# Patient Record
Sex: MALE | Race: WHITE | Employment: UNEMPLOYED | URBAN - METROPOLITAN AREA
[De-identification: names, ages, dates, MRNs, and addresses within clinical notes are randomized per-mention and may not be internally consistent; named-entity substitution may affect disease eponyms.]

---

## 2021-08-10 ENCOUNTER — OFFICE VISIT (OUTPATIENT)
Dept: FAMILY MEDICINE CLINIC | Age: 61
End: 2021-08-10
Payer: MEDICAID

## 2021-08-10 VITALS
SYSTOLIC BLOOD PRESSURE: 120 MMHG | DIASTOLIC BLOOD PRESSURE: 84 MMHG | BODY MASS INDEX: 25.6 KG/M2 | HEIGHT: 70 IN | WEIGHT: 178.8 LBS | TEMPERATURE: 97.2 F | RESPIRATION RATE: 16 BRPM | OXYGEN SATURATION: 98 % | HEART RATE: 66 BPM

## 2021-08-10 DIAGNOSIS — E11.42 TYPE 2 DIABETES MELLITUS WITH DIABETIC POLYNEUROPATHY, WITH LONG-TERM CURRENT USE OF INSULIN (HCC): Primary | ICD-10-CM

## 2021-08-10 DIAGNOSIS — Z79.4 TYPE 2 DIABETES MELLITUS WITH DIABETIC POLYNEUROPATHY, WITH LONG-TERM CURRENT USE OF INSULIN (HCC): Primary | ICD-10-CM

## 2021-08-10 DIAGNOSIS — I10 ESSENTIAL HYPERTENSION: ICD-10-CM

## 2021-08-10 DIAGNOSIS — E78.5 HYPERLIPIDEMIA, UNSPECIFIED HYPERLIPIDEMIA TYPE: ICD-10-CM

## 2021-08-10 DIAGNOSIS — E03.9 ACQUIRED HYPOTHYROIDISM: ICD-10-CM

## 2021-08-10 PROCEDURE — 99204 OFFICE O/P NEW MOD 45 MIN: CPT | Performed by: STUDENT IN AN ORGANIZED HEALTH CARE EDUCATION/TRAINING PROGRAM

## 2021-08-10 RX ORDER — GABAPENTIN 600 MG/1
600 TABLET ORAL 3 TIMES DAILY
Qty: 270 TABLET | Refills: 0 | Status: SHIPPED | OUTPATIENT
Start: 2021-08-10 | End: 2022-01-04 | Stop reason: SDUPTHER

## 2021-08-10 RX ORDER — LISINOPRIL 5 MG/1
5 TABLET ORAL DAILY
Qty: 90 TABLET | Refills: 1 | Status: SHIPPED | OUTPATIENT
Start: 2021-08-10 | End: 2022-01-04 | Stop reason: SDUPTHER

## 2021-08-10 RX ORDER — METFORMIN HYDROCHLORIDE 1000 MG/1
1000 TABLET ORAL 2 TIMES DAILY WITH MEALS
Qty: 180 TABLET | Refills: 1 | Status: SHIPPED | OUTPATIENT
Start: 2021-08-10 | End: 2022-01-04 | Stop reason: SDUPTHER

## 2021-08-10 RX ORDER — LEVOTHYROXINE SODIUM 88 UG/1
88 TABLET ORAL
Qty: 90 TABLET | Refills: 0 | Status: SHIPPED | OUTPATIENT
Start: 2021-08-10 | End: 2022-01-04 | Stop reason: DRUGHIGH

## 2021-08-10 RX ORDER — SYRINGE-NEEDLE,INSULIN,0.5 ML 31 GX5/16"
SYRINGE, EMPTY DISPOSABLE MISCELLANEOUS
Qty: 100 SYRINGE | Refills: 11 | Status: SHIPPED | OUTPATIENT
Start: 2021-08-10 | End: 2022-01-12 | Stop reason: SDUPTHER

## 2021-08-10 RX ORDER — LEVOTHYROXINE SODIUM 75 UG/1
TABLET ORAL
COMMUNITY
End: 2021-08-10

## 2021-08-10 RX ORDER — INSULIN LISPRO 100 [IU]/ML
INJECTION, SOLUTION INTRAVENOUS; SUBCUTANEOUS
Qty: 30 VIAL | Refills: 0
Start: 2021-08-10 | End: 2022-04-12 | Stop reason: SDUPTHER

## 2021-08-10 RX ORDER — LANCETS
EACH MISCELLANEOUS
Qty: 1 EACH | Refills: 11 | Status: SHIPPED | OUTPATIENT
Start: 2021-08-10

## 2021-08-10 RX ORDER — INSULIN PUMP SYRINGE, 3 ML
EACH MISCELLANEOUS
Qty: 1 KIT | Refills: 0 | Status: SHIPPED | OUTPATIENT
Start: 2021-08-10

## 2021-08-10 RX ORDER — ATORVASTATIN CALCIUM 40 MG/1
40 TABLET, FILM COATED ORAL DAILY
Qty: 90 TABLET | Refills: 0 | Status: SHIPPED | OUTPATIENT
Start: 2021-08-10 | End: 2022-01-04 | Stop reason: SDUPTHER

## 2021-08-10 RX ORDER — METFORMIN HYDROCHLORIDE 500 MG/1
TABLET ORAL 2 TIMES DAILY WITH MEALS
COMMUNITY
End: 2021-08-10

## 2021-08-10 NOTE — PROGRESS NOTES
Name and  Verified. Previous PCP: Juan Reyes  NP: Florian Hassan [facility])    Released Signed      Chief Complaint   Patient presents with    [facility].     Released form [facility] 3/7/2021. Patient stated that he feels frustrated. Trying to get life back together Denies any stress or depression. 1. Have you been to the ER, urgent care clinic since your last visit? Hospitalized since your last visit? No    2. Have you seen or consulted any other health care providers outside of the [address] [place] [address] since your last visit? Include any pap smears or colon screening.  No      Health Maintenance Due   Topic Date Due    Hepatitis C Screening  Never done    DTaP/Tdap/Td series (1 - Tdap) Never done    Lipid Screen  Never done    Colorectal Cancer Screening Combo  Never done    Shingrix Vaccine Age 50> (1 of 2) Never done

## 2021-08-10 NOTE — PROGRESS NOTES
[facility]  [address] [place], [address] [place] [address]  [phone]    C/C: Diabetes, HLD and Hypothyroidism    HPI:    Zachary Mansfield is a 63 y.o. male who presents to clinic today for evaluation of the issues listed above. Pt is new to the practice . [facility]ased in 03/2021 after a year of incarceration. PMHx: Reviewed and updated under problems. Current concerns;    1. DM type II:  Diagnosed around 2008. Has been on Metformin in the past in addition to insulin in the past.   Out of his medications for several months since released from senior living because of no insurance and limited finances. 2. Acquired hypothyroidism  Has been on Levothyroxine for years. States that he had overactive thyroid and he developed hypothyroidism after undergoing radioactive iodine. Pt has not had any of his medications for a several months due to no finances and no insurances. 3. HLD: Was on lipitor    Pt denies any  fever, chill, night sweats, chest pain, pressure, SOB, OLIVEIRA, PND, orthopnea, abdominal pain, n/v/d, melena, hematuria, dysuria, constipation, HA, dizziness, and syncope. Other Health Habits and social history:  Smoking history: no  Alcohol history: no  Occupation: Works at [employer] (on [employer])  Marital status: Patient is currently  and has 2 children (27 yo son and daughter 22 yo)    Current Medications: Reviewed and updated in the chart.     Allergies- reviewed:   Not on File    Past Medical History- reviewed:  Past Medical History:   Diagnosis Date    Diabetes ([facility] [address]     Diverticulosis     Neuropathy     Thyroid disease        Family History - reviewed:  Family History   Problem Relation Age of Onset    Heart Disease Mother     Cancer Mother     Cancer Father     Cancer Sister        Social History - reviewed:  Social History     Socioeconomic History    Marital status: UNKNOWN     Spouse name: Not on file    Number of children: Not on file    Years of education: Not on file    Highest education level: Not on file   Occupational History    Not on file   Tobacco Use    Smoking status: Former Smoker     Years: 20.00     Types: Cigarettes    Smokeless tobacco: Never Used    Tobacco comment: Quit 25 years ago   Vaping Use    Vaping Use: Never used   Substance and Sexual Activity    Alcohol use: Not Currently    Drug use: Not Currently    Sexual activity: Not Currently   Other Topics Concern    Not on file   Social History Narrative    Not on file     Social Determinants of Health     Financial Resource Strain:     Difficulty of Paying Living Expenses:    Food Insecurity:     Worried About Running Out of Food in the Last Year:     [address] in the Last Year:    Transportation Needs:     Lack of Transportation (Medical):  Lack of Transportation (Non-Medical):    Physical Activity:     Days of Exercise per Week:     Minutes of Exercise per Session:    Stress:     Feeling of Stress :    Social Connections:     Frequency of Communication with Friends and Family:     Frequency of Social Gatherings with Friends and Family:     Attends Gnosticist Services:     Active Member of Clubs or Organizations:     Attends Club or Organization Meetings:     Marital Status:    Intimate Partner Violence:     Fear of Current or Ex-Partner:     Emotionally Abused:     Physically Abused:     Sexually Abused:        Review of systems:     A comprehensive review of systems was negative except for that written in the History of Present Illness. Visit Vitals  /84 (BP 1 Location: Left upper arm, BP Patient Position: Sitting, BP Cuff Size: Adult)   Pulse 66   Temp 97.2 °F (36.2 °C) (Oral)   Resp 16   Ht 5' 10\" (1.778 m)   Wt 178 lb 12.8 oz (81.1 kg)   SpO2 98%   BMI 25.66 kg/m²       General: Alert and oriented, in no acute distress. Well nourished. EYE: PERRL.  Sclera and conjuctival clear. Extraocular movements intact. EARS: External normal, canals clear, tympanic membranes normal.   NOSE: Mucosa healthy without drainage or ulceration. OROPHARYNX: No suspicious lesions, normal dentition, pharynx, tongue and tonsils normal.  NECK: Supple; no masses; thyroid normal.  LUNGS: Respirations unlabored; clear to auscultation bilaterally. CARDIOVASCULAR: Regular, rate, and rhythm without murmurs, gallops or rubs. ABDOMEN: Soft; nontender; nondistended; normoactive bowel sounds; no masses or organomegaly. MUSCULOSKELETAL: FROM in all extremities     EXT: No edema. Neurovascularlly intact. Normal gait. SKIN: No rash. No suspicious lesions or moles. Neuro: Mental Status: Pt is alert and oriented to person, place, and time. Assessment/Plan       ICD-10-CM ICD-9-CM    1. Type 2 diabetes mellitus with diabetic polyneuropathy, with long-term current use of insulin (HCC)  E11.42 250.60 HEMOGLOBIN A1C WITH EAG    U74.3 250.1 METABOLIC PANEL, BASIC     V58.67 MICROALBUMIN, UR, RAND W/ MICROALB/CREAT RATIO      insulin NPH (NovoLIN N NPH U-100 Insulin) 100 unit/mL injection      insulin lispro (HUMALOG) 100 unit/mL injection      metFORMIN (GLUCOPHAGE) 1,000 mg tablet      gabapentin (NEURONTIN) 600 mg tablet      Insulin Syringe-Needle U-100 0.3 mL 29 gauge x 1/2\" syrg      MICROALBUMIN, UR, RAND W/ MICROALB/CREAT RATIO      METABOLIC PANEL, BASIC      HEMOGLOBIN A1C WITH EAG      lisinopriL (PRINIVIL, ZESTRIL) 5 mg tablet      Blood-Glucose Meter monitoring kit      lancets misc      glucose blood VI test strips (ASCENSIA AUTODISC VI, ONE TOUCH ULTRA TEST VI) strip   2. Acquired hypothyroidism  E03.9 244.9 TSH 3RD GENERATION      TSH 3RD GENERATION      levothyroxine (Levo-T) 88 mcg tablet   3.  Hyperlipidemia, unspecified hyperlipidemia type  E78.5 272.4 LIPID PANEL      atorvastatin (LIPITOR) 40 mg tablet      LIPID PANEL   4. Essential hypertension  I10 401.9 lisinopriL (PRINIVIL, ZESTRIL) 5 mg tablet       1. Type 2 diabetes mellitus with diabetic polyneuropathy, with long-term current use of insulin (HCC)    - HEMOGLOBIN A1C WITH EAG; Future  - METABOLIC PANEL, BASIC; Future  - MICROALBUMIN, UR, RAND W/ MICROALB/CREAT RATIO; Future  - insulin NPH (NovoLIN N NPH U-100 Insulin) 100 unit/mL injection; 20 Units by SubCUTAneous route Before breakfast and dinner. Dispense: 12 mL; Refill: 2  - insulin lispro (HUMALOG) 100 unit/mL injection; follow sliding scale  - metFORMIN (GLUCOPHAGE) 1,000 mg tablet; Take 1 Tablet by mouth two (2) times daily (with meals). - gabapentin (NEURONTIN) 600 mg tablet; Take 1 Tablet by mouth three (3) times daily for 90 days. - Insulin Syringe-Needle U-100 0.3 mL 29 gauge x 1/2\" syrg; Use one syringe per insulin injection  Dispense: 100 Syringe; Refill: 11  - MICROALBUMIN, UR, RAND W/ MICROALB/CREAT RATIO  - METABOLIC PANEL, BASIC  - HEMOGLOBIN A1C WITH EAG  - lisinopriL (PRINIVIL, ZESTRIL) 5 mg tablet; Take 1 Tablet by mouth daily.    - Blood-Glucose Meter monitoring kit; Check blood sugar daily  - lancets misc; Use one new lancet per blood glucose check    - glucose blood VI test strips (ASCENSIA AUTODISC VI, ONE TOUCH ULTRA TEST VI) strip; Use one new test strip per blood glucose check     2. Acquired hypothyroidism  - TSH 3RD GENERATION; Future  - Levothyroxine 88 mcg daily    3. Screening cholesterol level  - LIPID PANEL; Future    4. Hyperlipidemia, unspecified hyperlipidemia type  - atorvastatin (LIPITOR) 40 mg tablet; Take 1 Tablet by mouth daily.    - I counseled on lifestyle changes; discussed of the importance of eating habits/patterns and physical activity to overall health. Also discussed the importance to avoid smoking and excessive alcohol consumption.  - Encouraged to eat foods that are baked, broiled, boiled, steamed or grilled. Avoid greasy or fried foods. Use olive oil or canola oil instead of vegetable oil. Eat fish twice weekly.  Decreasing weight by 5-10% of baseline weight over the next 6 months if overweight/obese helps to improve obesity-related conditions. Eat a low carbohydrate diet. Decrease sugary beverages, white foods and sweets. Increase exercise to 5 days weekly for 30 minutes daily minimally and as tolerated. Have at an average of 7 hours of uninterrupted sleep at night. 5. Essential hypertension  Lisinopril 5 mg daily    Follow up: 4-6 weeks    On this date 08/10/21 I have spent 45 minutes reviewing previous notes, test results and face to face  with the patient discussing the diagnosis and importance of compliance with the treatment plan as well as documenting on the day of the visit. I have discussed the diagnosis with the patient and the intended plan as seen in the above orders. The patient has received an after-visit summary and questions were answered concerning future plans. I have discussed medication side effects and warnings with the patient as well. Informed patient to return to the office if new symptoms arise.     Signed By: Christina Null MD     August 10, 2021

## 2021-08-11 LAB
ANION GAP SERPL CALC-SCNC: 4 MMOL/L (ref 5–15)
BUN SERPL-MCNC: 19 MG/DL (ref 6–20)
BUN/CREAT SERPL: 16 (ref 12–20)
CALCIUM SERPL-MCNC: 9.9 MG/DL (ref 8.5–10.1)
CHLORIDE SERPL-SCNC: 103 MMOL/L (ref 97–108)
CHOLEST SERPL-MCNC: 269 MG/DL
CO2 SERPL-SCNC: 29 MMOL/L (ref 21–32)
CREAT SERPL-MCNC: 1.19 MG/DL (ref 0.7–1.3)
CREAT UR-MCNC: 160 MG/DL
EST. AVERAGE GLUCOSE BLD GHB EST-MCNC: 278 MG/DL
GLUCOSE SERPL-MCNC: 193 MG/DL (ref 65–100)
HBA1C MFR BLD: 11.3 % (ref 4–5.6)
HDLC SERPL-MCNC: 70 MG/DL
HDLC SERPL: 3.8 {RATIO} (ref 0–5)
LDLC SERPL CALC-MCNC: 167.8 MG/DL (ref 0–100)
MICROALBUMIN UR-MCNC: 3.83 MG/DL
MICROALBUMIN/CREAT UR-RTO: 24 MG/G (ref 0–30)
POTASSIUM SERPL-SCNC: 4.2 MMOL/L (ref 3.5–5.1)
SODIUM SERPL-SCNC: 136 MMOL/L (ref 136–145)
TRIGL SERPL-MCNC: 156 MG/DL (ref ?–150)
TSH SERPL DL<=0.05 MIU/L-ACNC: 56.6 UIU/ML (ref 0.36–3.74)
VLDLC SERPL CALC-MCNC: 31.2 MG/DL

## 2021-08-13 NOTE — PROGRESS NOTES
Reviewed. HLD  A1C 11.3  TSH 56.60    Results expected as pt released from long-term and ran out of all his meds for at least a month.   Refilled all the medications including insulin and will follow in 4 weeks

## 2021-08-17 ENCOUNTER — DOCUMENTATION ONLY (OUTPATIENT)
Dept: FAMILY MEDICINE CLINIC | Age: 61
End: 2021-08-17

## 2021-08-17 DIAGNOSIS — Z79.4 TYPE 2 DIABETES MELLITUS WITH DIABETIC POLYNEUROPATHY, WITH LONG-TERM CURRENT USE OF INSULIN (HCC): ICD-10-CM

## 2021-08-17 DIAGNOSIS — E11.42 TYPE 2 DIABETES MELLITUS WITH DIABETIC POLYNEUROPATHY, WITH LONG-TERM CURRENT USE OF INSULIN (HCC): ICD-10-CM

## 2021-08-17 NOTE — PROGRESS NOTES
Reviewed Notes from [facility]    Per report, pt Has PMH as reported. Report does not indicate what medications or doses which he is currently on    Full report will be scanned into chart.     Signed By: Cailin Doe MD     August 17, 2021

## 2021-08-17 NOTE — TELEPHONE ENCOUNTER
Per pharmacy faxed request. Previous order 0.3 mL 29 gauge x 1/2\" is not able to be order by pharmacy. Requesting 0.3mL 31 gauge 5/16\" . Request uploaded and sent to Dr. Capri Worrell to sign off.

## 2022-01-04 ENCOUNTER — OFFICE VISIT (OUTPATIENT)
Dept: FAMILY MEDICINE CLINIC | Age: 62
End: 2022-01-04
Payer: COMMERCIAL

## 2022-01-04 VITALS
RESPIRATION RATE: 16 BRPM | WEIGHT: 170.6 LBS | TEMPERATURE: 98.4 F | BODY MASS INDEX: 24.42 KG/M2 | SYSTOLIC BLOOD PRESSURE: 109 MMHG | OXYGEN SATURATION: 99 % | HEART RATE: 68 BPM | HEIGHT: 70 IN | DIASTOLIC BLOOD PRESSURE: 67 MMHG

## 2022-01-04 DIAGNOSIS — E78.5 HYPERLIPIDEMIA, UNSPECIFIED HYPERLIPIDEMIA TYPE: ICD-10-CM

## 2022-01-04 DIAGNOSIS — Z12.11 COLON CANCER SCREENING: ICD-10-CM

## 2022-01-04 DIAGNOSIS — Z11.59 NEED FOR HEPATITIS C SCREENING TEST: ICD-10-CM

## 2022-01-04 DIAGNOSIS — Z79.4 TYPE 2 DIABETES MELLITUS WITH DIABETIC POLYNEUROPATHY, WITH LONG-TERM CURRENT USE OF INSULIN (HCC): Primary | ICD-10-CM

## 2022-01-04 DIAGNOSIS — F32.1 CURRENT MODERATE EPISODE OF MAJOR DEPRESSIVE DISORDER, UNSPECIFIED WHETHER RECURRENT (HCC): ICD-10-CM

## 2022-01-04 DIAGNOSIS — I10 ESSENTIAL HYPERTENSION: ICD-10-CM

## 2022-01-04 DIAGNOSIS — E11.42 TYPE 2 DIABETES MELLITUS WITH DIABETIC POLYNEUROPATHY, WITH LONG-TERM CURRENT USE OF INSULIN (HCC): Primary | ICD-10-CM

## 2022-01-04 DIAGNOSIS — E03.9 ACQUIRED HYPOTHYROIDISM: ICD-10-CM

## 2022-01-04 LAB
HBA1C MFR BLD HPLC: 12.4 %
HCV AB SERPL QL IA: NONREACTIVE
T4 FREE SERPL-MCNC: 0.3 NG/DL (ref 0.8–1.5)
TSH SERPL DL<=0.05 MIU/L-ACNC: 52.5 UIU/ML (ref 0.36–3.74)

## 2022-01-04 PROCEDURE — 83036 HEMOGLOBIN GLYCOSYLATED A1C: CPT | Performed by: STUDENT IN AN ORGANIZED HEALTH CARE EDUCATION/TRAINING PROGRAM

## 2022-01-04 PROCEDURE — 99214 OFFICE O/P EST MOD 30 MIN: CPT | Performed by: STUDENT IN AN ORGANIZED HEALTH CARE EDUCATION/TRAINING PROGRAM

## 2022-01-04 RX ORDER — DULOXETIN HYDROCHLORIDE 30 MG/1
CAPSULE, DELAYED RELEASE ORAL
Qty: 90 CAPSULE | Refills: 0 | Status: SHIPPED | OUTPATIENT
Start: 2022-01-04 | End: 2022-02-28 | Stop reason: SDUPTHER

## 2022-01-04 RX ORDER — LEVOTHYROXINE SODIUM 112 UG/1
112 TABLET ORAL
Qty: 90 TABLET | Refills: 0 | Status: SHIPPED | OUTPATIENT
Start: 2022-01-04 | End: 2022-04-13 | Stop reason: SDUPTHER

## 2022-01-04 RX ORDER — METFORMIN HYDROCHLORIDE 1000 MG/1
1000 TABLET ORAL 2 TIMES DAILY WITH MEALS
Qty: 180 TABLET | Refills: 1 | Status: SHIPPED | OUTPATIENT
Start: 2022-01-04

## 2022-01-04 RX ORDER — LEVOTHYROXINE SODIUM 88 UG/1
88 TABLET ORAL
Qty: 90 TABLET | Refills: 0 | Status: CANCELLED | OUTPATIENT
Start: 2022-01-04

## 2022-01-04 RX ORDER — ATORVASTATIN CALCIUM 40 MG/1
40 TABLET, FILM COATED ORAL DAILY
Qty: 90 TABLET | Refills: 0 | Status: SHIPPED | OUTPATIENT
Start: 2022-01-04 | End: 2022-07-07 | Stop reason: SDUPTHER

## 2022-01-04 RX ORDER — LISINOPRIL 5 MG/1
5 TABLET ORAL DAILY
Qty: 90 TABLET | Refills: 1 | Status: CANCELLED | OUTPATIENT
Start: 2022-01-04

## 2022-01-04 RX ORDER — LISINOPRIL 5 MG/1
TABLET ORAL
Qty: 90 TABLET | Refills: 2 | Status: SHIPPED | OUTPATIENT
Start: 2022-01-04 | End: 2022-07-07 | Stop reason: SDUPTHER

## 2022-01-04 RX ORDER — INSULIN GLARGINE 100 [IU]/ML
30 INJECTION, SOLUTION SUBCUTANEOUS
Qty: 27 ML | Refills: 0 | Status: SHIPPED | OUTPATIENT
Start: 2022-01-04 | End: 2022-04-12 | Stop reason: SDUPTHER

## 2022-01-04 RX ORDER — GABAPENTIN 600 MG/1
600 TABLET ORAL 3 TIMES DAILY
Qty: 270 TABLET | Refills: 0 | Status: SHIPPED | OUTPATIENT
Start: 2022-01-04 | End: 2022-07-07 | Stop reason: SDUPTHER

## 2022-01-04 RX ORDER — METFORMIN HYDROCHLORIDE 1000 MG/1
1000 TABLET ORAL 2 TIMES DAILY WITH MEALS
Qty: 180 TABLET | Refills: 1 | Status: CANCELLED | OUTPATIENT
Start: 2022-01-04

## 2022-01-04 NOTE — PROGRESS NOTES
Chief Complaint   Patient presents with    Follow-up     foot pain, med refills       1. Have you been to the ER, urgent care clinic since your last visit? Hospitalized since your last visit? No    2. Have you seen or consulted any other health care providers outside of the [address] [place] [address] since your last visit? Include any pap smears or colon screening. No     Pt is here for routine follow up today - needs to discuss worsening foot pain and neuropathy. Requesting refill of gabapentin today. He also would like to discuss nausea and stomach pain especially in the morning - he is taking omperazole otc and lots of tylenol with no relief. He says he frequently does not feel hungry.

## 2022-01-04 NOTE — PROGRESS NOTES
[address]  [phone]    C/C: Diabetes, HLD and Hypothyroidism    HPI:    Douglas Wu is a 63 y.o. male who presents to clinic today for evaluation of the issues listed above. Released from incarceration on 03/2021. This is a long overdue visit. As patient states that he did not have a car at that time but now he has a car. Current concerns;    1. DM type II with polyneuropathy:  Diagnosed around 2008. Does not check  BG constantly at home although he has all the supplies. Metformin 1000 mg BID. Has not been compliant with his insulin regimen (NPH). States that he ran out but it is unclear why he did not get refills at the pharmacy. States that he has been using his Brother's Lantus, injecting 25 units daily (previously on NPH 20units while incarcerated). Also uses correctional scale when needed. Admits that his diet isn't the best as he likes sweet stuffs. Takes Gabapentin for neuropathy. Has intermittent heartburn especially in the morning. Takes omeprazole as needed which helps. 2. Acquired hypothyroidism  Has been on Levothyroxine for years. States that he had overactive thyroid and he developed hypothyroidism after undergoing radioactive iodine. Has not been compliant with his medications. Last TSH of 56.60. 3. HLD: Was on lipitor    Pt denies any  fever, chill, night sweats, chest pain or SOB.       Other Health Habits and social history:  Smoking history: no  Alcohol history: no  Occupation: Works at [employer] (on [employer])  Marital status: Patient is currently  and has 2 children (27 yo son and daughter 22 yo)    Allergies- reviewed:   No Known Allergies    Past Medical History- reviewed:  Past Medical History:   Diagnosis Date    Diabetes ([facility] [address]     Diverticulosis     Neuropathy     Thyroid disease        Family History - reviewed:  Family History   Problem Relation Age of Onset    Heart Disease Mother     Cancer Mother     Cancer Father     Cancer Sister        Social History - reviewed:  Social History     Socioeconomic History    Marital status: UNKNOWN     Spouse name: Not on file    Number of children: Not on file    Years of education: Not on file    Highest education level: Not on file   Occupational History    Not on file   Tobacco Use    Smoking status: Former Smoker     Years: 20.00     Types: Cigarettes    Smokeless tobacco: Never Used    Tobacco comment: Quit 25 years ago   Vaping Use    Vaping Use: Never used   Substance and Sexual Activity    Alcohol use: Not Currently    Drug use: Not Currently    Sexual activity: Not Currently   Other Topics Concern    Not on file   Social History Narrative    Not on file     Social Determinants of Health     Financial Resource Strain:     Difficulty of Paying Living Expenses: Not on file   Food Insecurity:     Worried About [address] in the Last Year: Not on file    [address] [place] in the Last Year: Not on file   Transportation Needs:     Lack of Transportation (Medical): Not on file    Lack of Transportation (Non-Medical):  Not on file   Physical Activity:     Days of Exercise per Week: Not on file    Minutes of Exercise per Session: Not on file   Stress:     Feeling of Stress : Not on file   Social Connections:     Frequency of Communication with Friends and Family: Not on file    Frequency of Social Gatherings with Friends and Family: Not on file    Attends Taoist Services: Not on file    Active Member of Clubs or Organizations: Not on file    Attends Club or Organization Meetings: Not on file    Marital Status: Not on file   Intimate Partner Violence:     Fear of Current or Ex-Partner: Not on file    Emotionally Abused: Not on file    Physically Abused: Not on file    Sexually Abused: Not on file   Housing Stability:     Unable to Pay for Housing in the Last Year: Not on file  Number of Places Lived in the Last Year: Not on file    Unstable Housing in the Last Year: Not on file       Review of systems:     A comprehensive review of systems was negative except for that written in the History of Present Illness. Visit Vitals  /67   Pulse 68   Temp 98.4 °F (36.9 °C) (Oral)   Resp 16   Ht 5' 10\" (1.778 m)   Wt 170 lb 9.6 oz (77.4 kg)   SpO2 99%   BMI 24.48 kg/m²       General: Alert and oriented, in no acute distress. Well nourished. EYE: PERRL. Sclera and conjuctival clear. Extraocular movements intact. EARS: External normal, canals clear, tympanic membranes normal.   NOSE: Mucosa healthy without drainage or ulceration. OROPHARYNX: No suspicious lesions, normal dentition, pharynx, tongue and tonsils normal.  NECK: Supple; no masses; thyroid normal.  LUNGS: Respirations unlabored; clear to auscultation bilaterally. CARDIOVASCULAR: Regular, rate, and rhythm without murmurs, gallops or rubs. ABDOMEN: Soft; nontender; nondistended; normoactive bowel sounds; no masses or organomegaly. MUSCULOSKELETAL: FROM in all extremities     EXT: No edema. Neurovascularlly intact. Normal gait. SKIN: No rash. No suspicious lesions or moles. Neuro: Mental Status: Pt is alert and oriented to person, place, and time.       3 most recent PHQ Screens 1/4/2022   Little interest or pleasure in doing things More than half the days   Feeling down, depressed, irritable, or hopeless More than half the days   Total Score PHQ 2 4   Trouble falling or staying asleep, or sleeping too much Nearly every day   Feeling tired or having little energy Nearly every day   Poor appetite, weight loss, or overeating Not at all   Feeling bad about yourself - or that you are a failure or have let yourself or your family down Several days   Trouble concentrating on things such as school, work, reading, or watching TV Not at all   Moving or speaking so slowly that other people could have noticed; or the opposite being so fidgety that others notice Several days   Thoughts of being better off dead, or hurting yourself in some way Not at all   PHQ 9 Score 12   How difficult have these problems made it for you to do your work, take care of your home and get along with others Somewhat difficult         Assessment/Plan       ICD-10-CM ICD-9-CM    1. Type 2 diabetes mellitus with diabetic polyneuropathy, with long-term current use of insulin (HCC)  E11.42 250.60 AMB POC HEMOGLOBIN A1C    Z79.4 357.2  DIABETES FOOT EXAM     V58.67 empagliflozin (JARDIANCE) 10 mg tablet      metFORMIN (GLUCOPHAGE) 1,000 mg tablet      insulin glargine (LANTUS,BASAGLAR) 100 unit/mL (3 mL) inpn      T4, FREE      TSH 3RD GENERATION      gabapentin (NEURONTIN) 600 mg tablet      REFERRAL TO DIABETIC EDUCATION   2. Essential hypertension  I10 401.9 lisinopriL (PRINIVIL, ZESTRIL) 5 mg tablet   3. Acquired hypothyroidism  E03.9 244.9 TSH 3RD GENERATION      T4, FREE      levothyroxine (SYNTHROID) 112 mcg tablet   4. Colon cancer screening  Z12.11 V76.51 REFERRAL FOR COLONOSCOPY   5. Need for hepatitis C screening test  Z11.59 V73.89 HEPATITIS C AB      HEPATITIS C AB   6. Hyperlipidemia, unspecified hyperlipidemia type  E78.5 272.4 atorvastatin (LIPITOR) 40 mg tablet   7. Current moderate episode of major depressive disorder, unspecified whether recurrent (HCC)  F32.1 296.22 DULoxetine (CYMBALTA) 30 mg capsule     1. Type 2 diabetes mellitus with diabetic polyneuropathy, with long-term current use of insulin ([facility] [facility]    Discussed the importance of compliance with his medications and close follow up. His A1C is worsening. Will add Jardiance along with the savings card and increase Lantus dose. Lab Results   Component Value Date/Time    Hemoglobin A1c 11.3 (H) 08/10/2021 11:18 AM    Hemoglobin A1c (POC) 12.4 01/04/2022 10:30 AM     - START empagliflozin (JARDIANCE) 10 mg tablet; Take 1 Tablet by mouth daily.   - Continue metFORMIN (GLUCOPHAGE) 1,000 mg tablet; Take 1 Tablet by mouth two (2) times daily (with meals). - START insulin glargine (LANTUS,BASAGLAR) 100 unit/mL (3 mL) inpn; 30 Units by SubCUTAneous route nightly for 90 days. - Continue gabapentin (NEURONTIN) 600 mg tablet; Take 1 Tablet by mouth three (3) times daily for 90 days. - Continue home monitoring. Glucose goals; Fasting (), preprandial (<140), 2-hr post-prandial (<180)  - Will refer for diabetes education/counseling (Dr. Louise Sherman, PharmD)  -  DIABETES FOOT EXAM  - Had eye exam recently    Diabetic issues reviewed : glycemic goals , written exchange diet given, low carbohydrate diet (avoid white bread, white rice, white pasta, white potatoes, sodas, and sweets) , weight control (exercise at least 30 mins daily x 5 days), home glucose monitoring emphasized,  hypoglycemia management and long term diabetic complications discussed. Flu shot annually ,Pneumovax (once before 72years old and then again after your are 72years old),aspirin daily,ACE-I/ARB and statin if indicated, annual eye exam and microalbumin. 2. Essential hypertension  Will half his Lisinopril as BP trend low  - lisinopriL (PRINIVIL, ZESTRIL) 5 mg tablet; TAKE 1/2 TABLET DAILY    3. Acquired hypothyroidism  Noncompliant. .  Last TSH >56  - START levothyroxine (SYNTHROID) 112 mcg tablet; Take 1 Tablet by mouth Daily (before breakfast). 4. Colon cancer screening  - REFERRAL FOR COLONOSCOPY    5. Need for hepatitis C screening test  - HEPATITIS C AB; Future    6. Hyperlipidemia, unspecified hyperlipidemia type  - start atorvastatin (LIPITOR) 40 mg tablet; Take 1 Tablet by mouth daily. 7. Current moderate episode of major depressive disorder, unspecified whether recurrent ([facility] [address]  We discussed multi-faceted approach to depression including anti-depressant medication, counseling, exercise, building and maintaining support network/relationships, jennifer community.  No SI/HI    He is not interested in therapy or going to psych.    -Scored 12/27 on PHQ-9  - DULoxetine (CYMBALTA) 30 mg capsule; TAKE ONE TABLET DAILY IN THE MORNING. INCREASE TO ONE TABLET TWICE DAILY IF TOLERATED AFTER 1-2 WEEKS      - Patient Education:  Reviewed concept of anxiety/depression as biochemical imbalance of neurotransmitters and rationale for treatment. Explained that SSRI/SNRI can take 2-3 weeks before symptoms subside. - Instructed patient to contact office or on-call physician promptly should condition worsen or any new symptoms appear.  -  IF THE PATIENT HAS ANY SUICIDAL OR HOMICIDAL IDEATION, CALL THE OFFICE, DISCUSS WITH A SUPPORT MEMBER OR GO TO THE ER IMMEDIATELY. Patient was agreeable with this plan. - The National Suicide Prevention Lifeline provides free and confidential emotional support to people in suicidal crisi or emotional distress. The services are provided 24 hours a day, 7 days a week. Please call 6-672-838-OYUV ([phone]) if needed. Follow up: 2-3 months with me. Will follow up sooner with Dr. Alexia Bass for his diabetes    I have discussed the diagnosis with the patient and the intended plan as seen in the above orders. The patient has received an after-visit summary and questions were answered concerning future plans. I have discussed medication side effects and warnings with the patient as well. Informed patient to return to the office if new symptoms arise.     Signed By: Gloria Rogers MD     January 4, 2022

## 2022-01-05 NOTE — PROGRESS NOTES
Reviewed. TSH 52.5, likely because of noncompliance.   Already increase synthroid dose and will follow up with patient

## 2022-01-12 ENCOUNTER — OFFICE VISIT (OUTPATIENT)
Dept: FAMILY MEDICINE CLINIC | Age: 62
End: 2022-01-12

## 2022-01-12 DIAGNOSIS — Z79.4 TYPE 2 DIABETES MELLITUS WITH DIABETIC POLYNEUROPATHY, WITH LONG-TERM CURRENT USE OF INSULIN (HCC): Primary | ICD-10-CM

## 2022-01-12 DIAGNOSIS — E11.42 TYPE 2 DIABETES MELLITUS WITH DIABETIC POLYNEUROPATHY, WITH LONG-TERM CURRENT USE OF INSULIN (HCC): Primary | ICD-10-CM

## 2022-01-12 RX ORDER — OMEPRAZOLE 20 MG/1
20 CAPSULE, DELAYED RELEASE ORAL DAILY
COMMUNITY
End: 2022-01-13 | Stop reason: SDUPTHER

## 2022-01-12 NOTE — PROGRESS NOTES
Pt referred by Dr. Wilver Arteaga for diabetes education and management. He's a fairly new patient establishing care with Dr. Wilver Arteaga. Last visit had run out of insulin and noted some issues with adherence. A1c of 12.4%  Should be taking Lantus, Humalog, metformin, and newly started jardiance. Of not elevated TSH at that visit also; will encourage adherence to levothyroxine as well. The hypothyroid state can lead to higher sugars as well. Pt presents feeling well overall. Motivated to get control of his diabetes now. Had had diabetes about 10 years he thinks. Both sides of family have diabetes, DM2 like he has  Oral medications first when diagnosed with diabetes and feels like was controlled for a while. Pt works at [employer]. 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   Reports adherence to levothyroxine - talked about importance of this  Buying omeprazole from over the counter - states he needs this daily for reflux; it's high in cost and he would like to see if his insurance covers it     SMBGs:  Checks sugars once daily not every day   280, 320, 360  Recall  Doesn't recall symptoms of low sugar  Hard to check his sugars when working. Discussed CGM with him as well as insulin pens for meal insulin if this would be easier to use while working or on the go. Diabetes is uncontrolled, may be improving with becoming more adherent and starting jardiance. Do not have enough information to fully assess, consider insulin adjustments. Did encourage continued adherence and discussed portion control with carbohydrates and sweets  Discussed diabetes goals and complication prevention. Encouraged pt to attend upcoming diabetes education classes that are scheduled   Sent Libre2 sensors to see if covered to consider using if so as CGM may help patient   Sent order for omeprazole to pharmacy   Add pen needles in also - no print  - getting from brother in law now but may need in the future   Per CPA with Dr. Tommy Grant need order for test strips also but does not know type so will let us know if needed     Follow up in 4 weeks with meter and Cesia if covered. Patient verbalized understanding of information presented. Answered all of the patient's questions. Doroteo Lin, PHARMD, [facility] [address] in place:  Yes   Recommendation Provided To: Patient/Caregiver: 3 via In person   Intervention Detail: New Rx: 1, reason: Needs Additional Therapy, Refill(s) Provided and Scheduled Appointment   Intervention Accepted By: Patient/Caregiver: 3   Time Spent (min): 60

## 2022-01-13 RX ORDER — OMEPRAZOLE 20 MG/1
20 CAPSULE, DELAYED RELEASE ORAL DAILY
Qty: 30 CAPSULE | Refills: 5 | Status: SHIPPED | OUTPATIENT
Start: 2022-01-13 | End: 2022-04-12 | Stop reason: DRUGHIGH

## 2022-01-13 RX ORDER — PEN NEEDLE, DIABETIC 31 GX3/16"
NEEDLE, DISPOSABLE MISCELLANEOUS
Qty: 100 PEN NEEDLE | Refills: 1
Start: 2022-01-13

## 2022-01-13 RX ORDER — OMEPRAZOLE 20 MG/1
20 CAPSULE, DELAYED RELEASE ORAL DAILY
Qty: 30 CAPSULE | Refills: 5 | Status: SHIPPED | OUTPATIENT
Start: 2022-01-13 | End: 2022-01-13 | Stop reason: SDUPTHER

## 2022-01-13 RX ORDER — FLASH GLUCOSE SENSOR
KIT MISCELLANEOUS
Qty: 2 KIT | Refills: 5 | Status: SHIPPED | OUTPATIENT
Start: 2022-01-13 | End: 2022-04-12 | Stop reason: SDUPTHER

## 2022-02-28 DIAGNOSIS — F32.1 CURRENT MODERATE EPISODE OF MAJOR DEPRESSIVE DISORDER, UNSPECIFIED WHETHER RECURRENT (HCC): ICD-10-CM

## 2022-02-28 NOTE — TELEPHONE ENCOUNTER
Last visit 01/04/2022 MD Cohen   Next appointment 04/12/2022 MD Cohen   Previous refill encounter(s)   01/04/2022 Cymbalta #90     Requested Prescriptions     Pending Prescriptions Disp Refills    DULoxetine (CYMBALTA) 30 mg capsule 180 Capsule 0     Sig: Take 1 Capsule by mouth two (2) times a day.

## 2022-03-02 RX ORDER — DULOXETIN HYDROCHLORIDE 30 MG/1
30 CAPSULE, DELAYED RELEASE ORAL 2 TIMES DAILY
Qty: 180 CAPSULE | Refills: 0 | Status: SHIPPED | OUTPATIENT
Start: 2022-03-02 | End: 2022-04-13 | Stop reason: SDUPTHER

## 2022-03-09 ENCOUNTER — NURSE TRIAGE (OUTPATIENT)
Dept: OTHER | Facility: CLINIC | Age: 62
End: 2022-03-09

## 2022-03-09 NOTE — TELEPHONE ENCOUNTER
Received call from [place] at [facility] with Red Flag Complaint. Subjective: Caller states started having left neck and shoulder pain 1 week ago,worsening symptoms past 2 days. Wiith movement has pain that starts left side in back,shoulder and neck, gets short of breath,heaviness in chest,hunches over,can't lift head up. [facility]. Current Symptoms: Tunnel vision at present,\"feels like I'm drunk\" speech slurred last night. Chest pain,SOB with exertion today,see above    Onset: 1 week ago shoulder and neck pain,now worsening symptoms. Associated Symptoms: NA    Pain Severity: 5/10 with movement     Temperature: Denies    What has been tried:     Recommended disposition: Call  Now    Care advice provided, patient verbalizes understanding; denies any other questions or concerns; instructed to call back for any new or worsening symptoms. Patient/caller agrees to calling 911    Attention Provider: Thank you for allowing me to participate in the care of your patient. The patient was connected to triage in response to information provided to the ECC. Please do not respond through this encounter as the response is not directed to a shared pool.     Reason for Disposition   Sounds like a life-threatening emergency to the triager    Protocols used: CHEST PAIN-ADULT-OH

## 2022-04-12 ENCOUNTER — OFFICE VISIT (OUTPATIENT)
Dept: FAMILY MEDICINE CLINIC | Age: 62
End: 2022-04-12
Payer: COMMERCIAL

## 2022-04-12 VITALS
WEIGHT: 161 LBS | OXYGEN SATURATION: 99 % | TEMPERATURE: 97.8 F | RESPIRATION RATE: 16 BRPM | HEIGHT: 70 IN | SYSTOLIC BLOOD PRESSURE: 112 MMHG | HEART RATE: 72 BPM | DIASTOLIC BLOOD PRESSURE: 85 MMHG | BODY MASS INDEX: 23.05 KG/M2

## 2022-04-12 DIAGNOSIS — K21.9 GASTROESOPHAGEAL REFLUX DISEASE, UNSPECIFIED WHETHER ESOPHAGITIS PRESENT: ICD-10-CM

## 2022-04-12 DIAGNOSIS — E03.8 OTHER SPECIFIED HYPOTHYROIDISM: ICD-10-CM

## 2022-04-12 DIAGNOSIS — Z12.11 COLON CANCER SCREENING: ICD-10-CM

## 2022-04-12 DIAGNOSIS — E11.42 TYPE 2 DIABETES MELLITUS WITH DIABETIC POLYNEUROPATHY, WITH LONG-TERM CURRENT USE OF INSULIN (HCC): Primary | ICD-10-CM

## 2022-04-12 DIAGNOSIS — Z79.4 TYPE 2 DIABETES MELLITUS WITH DIABETIC POLYNEUROPATHY, WITH LONG-TERM CURRENT USE OF INSULIN (HCC): Primary | ICD-10-CM

## 2022-04-12 LAB — HBA1C MFR BLD HPLC: 11.6 %

## 2022-04-12 PROCEDURE — 3046F HEMOGLOBIN A1C LEVEL >9.0%: CPT | Performed by: STUDENT IN AN ORGANIZED HEALTH CARE EDUCATION/TRAINING PROGRAM

## 2022-04-12 PROCEDURE — 99214 OFFICE O/P EST MOD 30 MIN: CPT | Performed by: STUDENT IN AN ORGANIZED HEALTH CARE EDUCATION/TRAINING PROGRAM

## 2022-04-12 PROCEDURE — 83036 HEMOGLOBIN GLYCOSYLATED A1C: CPT | Performed by: STUDENT IN AN ORGANIZED HEALTH CARE EDUCATION/TRAINING PROGRAM

## 2022-04-12 RX ORDER — FLASH GLUCOSE SCANNING READER
1 EACH MISCELLANEOUS DAILY
Qty: 1 EACH | Refills: 0 | Status: SHIPPED | OUTPATIENT
Start: 2022-04-12

## 2022-04-12 RX ORDER — INSULIN GLARGINE 100 [IU]/ML
40 INJECTION, SOLUTION SUBCUTANEOUS
Qty: 36 ML | Refills: 3 | Status: SHIPPED | OUTPATIENT
Start: 2022-04-12

## 2022-04-12 RX ORDER — PANTOPRAZOLE SODIUM 40 MG/1
40 TABLET, DELAYED RELEASE ORAL DAILY
Qty: 90 TABLET | Refills: 3 | Status: SHIPPED | OUTPATIENT
Start: 2022-04-12

## 2022-04-12 RX ORDER — INSULIN LISPRO 100 [IU]/ML
INJECTION, SOLUTION INTRAVENOUS; SUBCUTANEOUS
Qty: 6 ML | Refills: 0
Start: 2022-04-12 | End: 2022-04-13 | Stop reason: SDUPTHER

## 2022-04-12 RX ORDER — FLASH GLUCOSE SENSOR
KIT MISCELLANEOUS
Qty: 2 KIT | Refills: 5 | Status: SHIPPED | OUTPATIENT
Start: 2022-04-12 | End: 2022-10-25

## 2022-04-12 NOTE — PATIENT INSTRUCTIONS
DIABETES;  - TAKE 40 UNITS LANTUS DAILY  - CONTINUE METFORMIN AND JARDIANCE AT CURRENT DOSE  - SLIDING SCALE INSULIN;     150-200  Add 2 units     201-250  Add 4 units    251-300  Add 6 units  301-350  Add 8 units  351-400  Add 10 Units    - Work on diet and exercise.

## 2022-04-12 NOTE — PROGRESS NOTES
Name and  Verified. Pharmacy verified    Chief Complaint   Patient presents with    Follow-up     3 months 2022 Diabetes       1. Have you been to the ER, urgent care clinic since your last visit? Hospitalized since your last visit? No    2. Have you seen or consulted any other health care providers outside of the [address] [place] [address] since your last visit? Include any pap smears or colon screening.  No      Health Maintenance Due   Topic Date Due    Pneumococcal 0-64 years (1 of 2 - PPSV23) Never done    Eye Exam Retinal or Dilated  Never done    DTaP/Tdap/Td series (1 - Tdap) Never done    Colorectal Cancer Screening Combo  Never done    Shingrix Vaccine Age 50> (1 of 2) Never done    COVID-19 Vaccine (3 - Booster for Pfizer series) 2021    A1C test (Diabetic or Prediabetic)  2022

## 2022-04-12 NOTE — PROGRESS NOTES
[facility]  [address] [place], [address]  [phone]    C/C: Diabetes, depression and Hypothyroidism    HPI:    Aakash Guerrero is a 63 y.o. male who presents to clinic today for evaluation of the issues listed above. Current concerns;    1. DM type II with polyneuropathy:  Diagnosed around 2008. Has not been checking home BG at home. Metformin 1000 mg BID. Jardiance 10mg   Lantus 30 units daily  Has not been supplied Lispro, pt unsure why but states that insurance likely did not cover for it. Admits that his diet isn't the best as he likes sweet stuffs. 2. Acquired hypothyroidism  Last TSH >50. Pt previously not compliant as he ran out his medication. Now taking synthroid 112 mcg. Compliant with medications. 3) Depression:  States that he is doing well with Cymbalta. Pt denies any  fever, chill, night sweats, chest pain or SOB.     Other Health Habits and social history:  Smoking history: no  Alcohol history: no  Occupation: Works at [employer] (on [address])  Marital status: Patient is currently  and has 2 children (25 yo son and daughter 22 yo)    Allergies- reviewed:   No Known Allergies    Past Medical History- reviewed:  Past Medical History:   Diagnosis Date    Diabetes ([facility] [address]     Diverticulosis     Neuropathy     Thyroid disease        Family History - reviewed:  Family History   Problem Relation Age of Onset    Heart Disease Mother     Cancer Mother     Cancer Father     Cancer Sister        Social History - reviewed:  Social History     Socioeconomic History    Marital status: SINGLE     Spouse name: Not on file    Number of children: Not on file    Years of education: Not on file    Highest education level: Not on file   Occupational History    Not on file   Tobacco Use    Smoking status: Former Smoker     Years: 20.00     Types: Cigarettes    Smokeless tobacco: Never Used    Tobacco comment: Quit 25 years ago   Vaping Use    Vaping Use: Never used   Substance and Sexual Activity    Alcohol use: Not Currently    Drug use: Not Currently    Sexual activity: Not Currently   Other Topics Concern    Not on file   Social History Narrative    Not on file     Social Determinants of Health     Financial Resource Strain:     Difficulty of Paying Living Expenses: Not on file   Food Insecurity:     Worried About Running Out of Food in the Last Year: Not on file    [place] of Food in the Last Year: Not on file   Transportation Needs:     Lack of Transportation (Medical): Not on file    Lack of Transportation (Non-Medical): Not on file   Physical Activity:     Days of Exercise per Week: Not on file    Minutes of Exercise per Session: Not on file   Stress:     Feeling of Stress : Not on file   Social Connections:     Frequency of Communication with Friends and Family: Not on file    Frequency of Social Gatherings with Friends and Family: Not on file    Attends Zoroastrian Services: Not on file    Active Member of [address] [place] [address] or Organizations: Not on file    Attends Club or Organization Meetings: Not on file    Marital Status: Not on file   Intimate Partner Violence:     Fear of Current or Ex-Partner: Not on file    Emotionally Abused: Not on file    Physically Abused: Not on file    Sexually Abused: Not on file   Housing Stability:     Unable to Pay for Housing in the Last Year: Not on file    Number of [place] in the Last Year: Not on file    Unstable Housing in the Last Year: Not on file       Review of systems:     A comprehensive review of systems was negative except for that written in the History of Present Illness.        Visit Vitals  /85 (BP 1 Location: Right arm, BP Patient Position: Sitting, BP Cuff Size: Adult)   Pulse 72   Temp 97.8 °F (36.6 °C) (Oral)   Resp 16   Ht 5' 10\" (1.778 m)   Wt 161 lb (73 kg)   SpO2 99%   BMI 23.10 kg/m²       General: Alert and oriented, in no acute distress. Well nourished. EYE: PERRL. Sclera and conjuctival clear. Extraocular movements intact. EARS: External normal, canals clear, tympanic membranes normal.   NOSE: Mucosa healthy without drainage or ulceration. OROPHARYNX: No suspicious lesions, normal dentition, pharynx, tongue and tonsils normal.  NECK: Supple; no masses; thyroid normal.  LUNGS: Respirations unlabored; clear to auscultation bilaterally. CARDIOVASCULAR: Regular, rate, and rhythm without murmurs, gallops or rubs. ABDOMEN: Soft; nontender; nondistended; normoactive bowel sounds; no masses or organomegaly. MUSCULOSKELETAL: FROM in all extremities     EXT: No edema. Neurovascularlly intact. Normal gait. SKIN: No rash. No suspicious lesions or moles. Neuro: Mental Status: Pt is alert and oriented to person, place, and time. 3 most recent PHQ Screens 1/4/2022   Little interest or pleasure in doing things More than half the days   Feeling down, depressed, irritable, or hopeless More than half the days   Total Score PHQ 2 4   Trouble falling or staying asleep, or sleeping too much Nearly every day   Feeling tired or having little energy Nearly every day   Poor appetite, weight loss, or overeating Not at all   Feeling bad about yourself - or that you are a failure or have let yourself or your family down Several days   Trouble concentrating on things such as school, work, reading, or watching TV Not at all   Moving or speaking so slowly that other people could have noticed; or the opposite being so fidgety that others notice Several days   Thoughts of being better off dead, or hurting yourself in some way Not at all   PHQ 9 Score 12   How difficult have these problems made it for you to do your work, take care of your home and get along with others Somewhat difficult         Assessment/Plan       ICD-10-CM ICD-9-CM    1.  Type 2 diabetes mellitus with diabetic polyneuropathy, with long-term current use of insulin ([facility])  E11.42 250.60 AMB POC HEMOGLOBIN A1C    Z79.4 357.2      V58.67      1. Type 2 diabetes mellitus with diabetic polyneuropathy, with long-term current use of insulin (HCC)    Poorly controlled. Will increase Lantus to 40units daily. Lab Results   Component Value Date/Time    Hemoglobin A1c 11.3 (H) 08/10/2021 11:18 AM    Hemoglobin A1c (POC) 11.6 04/12/2022 09:38 AM       - Continue home monitoring. Glucose goals; Fasting (), preprandial (<140), 2-hr post-prandial (<180)    - flash glucose scanning reader (FreeStyle Cesia 2 [place]) misc; 1 UNSPECIFIED by Does Not Apply route daily. - flash glucose sensor (FreeStyle Cesia 2 Sensor) kit; Use to check sugar multiple days daily as directed. - METABOLIC PANEL, BASIC; Future  - START insulin glargine (LANTUS,BASAGLAR) 100 unit/mL (3 mL) inpn; 40 Units by SubCUTAneous route nightly. - insulin lispro (HUMALOG) 100 unit/mL injection; Follow sliding scale information of discharge paperwork   -Continue Metformin 1g BID and Jardiance 10mg daily. 2. Colon cancer screening  - COLOGUARD TEST (FECAL DNA COLORECTAL CANCER SCREENING)    3. Other specified hypothyroidism    - TSH 3RD GENERATION; Future  - T4, FREE; Future  - Continue Synthroid 112 mcg daily. 4. Gastroesophageal reflux disease, unspecified whether esophagitis present  - pantoprazole (PROTONIX) 40 mg tablet; Take 1 Tablet by mouth daily. Follow up: 3 months with me. Will follow up sooner with Dr. Helene Mccullough for his diabetes    I have discussed the diagnosis with the patient and the intended plan as seen in the above orders. The patient has received an after-visit summary and questions were answered concerning future plans. I have discussed medication side effects and warnings with the patient as well. Informed patient to return to the office if new symptoms arise.     Signed By: Omer Cheatham MD     April 12, 2022

## 2022-04-13 LAB
ANION GAP SERPL CALC-SCNC: 7 MMOL/L (ref 5–15)
BUN SERPL-MCNC: 25 MG/DL (ref 6–20)
BUN/CREAT SERPL: 23 (ref 12–20)
CALCIUM SERPL-MCNC: 10.3 MG/DL (ref 8.5–10.1)
CHLORIDE SERPL-SCNC: 98 MMOL/L (ref 97–108)
CO2 SERPL-SCNC: 29 MMOL/L (ref 21–32)
CREAT SERPL-MCNC: 1.11 MG/DL (ref 0.7–1.3)
GLUCOSE SERPL-MCNC: 383 MG/DL (ref 65–100)
POTASSIUM SERPL-SCNC: 4.4 MMOL/L (ref 3.5–5.1)
SODIUM SERPL-SCNC: 134 MMOL/L (ref 136–145)
T4 FREE SERPL-MCNC: 0.5 NG/DL (ref 0.8–1.5)
TSH SERPL DL<=0.05 MIU/L-ACNC: 33.2 UIU/ML (ref 0.36–3.74)

## 2022-04-14 DIAGNOSIS — E11.42 TYPE 2 DIABETES MELLITUS WITH DIABETIC POLYNEUROPATHY, WITH LONG-TERM CURRENT USE OF INSULIN (HCC): ICD-10-CM

## 2022-04-14 DIAGNOSIS — Z79.4 TYPE 2 DIABETES MELLITUS WITH DIABETIC POLYNEUROPATHY, WITH LONG-TERM CURRENT USE OF INSULIN (HCC): ICD-10-CM

## 2022-04-14 RX ORDER — INSULIN LISPRO 100 [IU]/ML
INJECTION, SOLUTION INTRAVENOUS; SUBCUTANEOUS
Qty: 6 ML | Refills: 5 | Status: SHIPPED | OUTPATIENT
Start: 2022-04-14 | End: 2022-04-15 | Stop reason: SDUPTHER

## 2022-04-15 DIAGNOSIS — E11.42 TYPE 2 DIABETES MELLITUS WITH DIABETIC POLYNEUROPATHY, WITH LONG-TERM CURRENT USE OF INSULIN (HCC): ICD-10-CM

## 2022-04-15 DIAGNOSIS — Z79.4 TYPE 2 DIABETES MELLITUS WITH DIABETIC POLYNEUROPATHY, WITH LONG-TERM CURRENT USE OF INSULIN (HCC): ICD-10-CM

## 2022-04-15 RX ORDER — INSULIN LISPRO 100 [IU]/ML
INJECTION, SOLUTION INTRAVENOUS; SUBCUTANEOUS
Qty: 6 ML | Refills: 5 | Status: SHIPPED | OUTPATIENT
Start: 2022-04-15

## 2022-04-21 NOTE — PROGRESS NOTES
Reviewed. TSH improving, though still very high (33.20). We discussed worsening A1C      Please inform patient that because his TSH is still very high, he should increase Synthroid dose as follows; Take one tablet of 112 mcg daily Monday to Friday and Double the dose on Saturday and Sunday.  We will recheck at follow up

## 2022-08-29 ENCOUNTER — HOSPITAL ENCOUNTER (EMERGENCY)
Age: 62
Discharge: HOME | End: 2022-08-29
Attending: EMERGENCY MEDICINE
Payer: COMMERCIAL

## 2022-08-29 ENCOUNTER — APPOINTMENT (OUTPATIENT)
Dept: CT IMAGING | Age: 62
End: 2022-08-29
Attending: EMERGENCY MEDICINE
Payer: COMMERCIAL

## 2022-08-29 VITALS
SYSTOLIC BLOOD PRESSURE: 135 MMHG | OXYGEN SATURATION: 98 % | HEIGHT: 72 IN | HEART RATE: 92 BPM | WEIGHT: 165 LBS | BODY MASS INDEX: 22.35 KG/M2 | DIASTOLIC BLOOD PRESSURE: 84 MMHG | RESPIRATION RATE: 20 BRPM | TEMPERATURE: 98.3 F

## 2022-08-29 DIAGNOSIS — M54.6 ACUTE BILATERAL THORACIC BACK PAIN: Primary | ICD-10-CM

## 2022-08-29 LAB
ALBUMIN SERPL-MCNC: 3.8 G/DL (ref 3.5–5)
ALBUMIN/GLOB SERPL: 1 {RATIO} (ref 1.1–2.2)
ALP SERPL-CCNC: 70 U/L (ref 45–117)
ALT SERPL-CCNC: 31 U/L (ref 12–78)
ANION GAP SERPL CALC-SCNC: 10 MMOL/L (ref 5–15)
AST SERPL-CCNC: 15 U/L (ref 15–37)
BASOPHILS # BLD: 0.1 K/UL (ref 0–0.1)
BASOPHILS NFR BLD: 1 % (ref 0–1)
BILIRUB SERPL-MCNC: 0.5 MG/DL (ref 0.2–1)
BUN SERPL-MCNC: 22 MG/DL (ref 6–20)
BUN/CREAT SERPL: 21 (ref 12–20)
CALCIUM SERPL-MCNC: 9.4 MG/DL (ref 8.5–10.1)
CHLORIDE SERPL-SCNC: 102 MMOL/L (ref 97–108)
CO2 SERPL-SCNC: 24 MMOL/L (ref 21–32)
CREAT SERPL-MCNC: 1.07 MG/DL (ref 0.7–1.3)
DIFFERENTIAL METHOD BLD: NORMAL
EOSINOPHIL # BLD: 0.1 K/UL (ref 0–0.4)
EOSINOPHIL NFR BLD: 2 % (ref 0–7)
ERYTHROCYTE [DISTWIDTH] IN BLOOD BY AUTOMATED COUNT: 12.4 % (ref 11.5–14.5)
GLOBULIN SER CALC-MCNC: 3.7 G/DL (ref 2–4)
GLUCOSE SERPL-MCNC: 222 MG/DL (ref 65–100)
HCT VFR BLD AUTO: 48 % (ref 36.6–50.3)
HGB BLD-MCNC: 16.7 G/DL (ref 12.1–17)
IMM GRANULOCYTES # BLD AUTO: 0 K/UL (ref 0–0.04)
IMM GRANULOCYTES NFR BLD AUTO: 0 % (ref 0–0.5)
LYMPHOCYTES # BLD: 1 K/UL (ref 0.8–3.5)
LYMPHOCYTES NFR BLD: 14 % (ref 12–49)
MCH RBC QN AUTO: 32.1 PG (ref 26–34)
MCHC RBC AUTO-ENTMCNC: 34.8 G/DL (ref 30–36.5)
MCV RBC AUTO: 92.1 FL (ref 80–99)
MONOCYTES # BLD: 0.6 K/UL (ref 0–1)
MONOCYTES NFR BLD: 8 % (ref 5–13)
NEUTS SEG # BLD: 5.3 K/UL (ref 1.8–8)
NEUTS SEG NFR BLD: 75 % (ref 32–75)
NRBC # BLD: 0 K/UL (ref 0–0.01)
NRBC BLD-RTO: 0 PER 100 WBC
PLATELET # BLD AUTO: 195 K/UL (ref 150–400)
PMV BLD AUTO: 9.1 FL (ref 8.9–12.9)
POTASSIUM SERPL-SCNC: 3.9 MMOL/L (ref 3.5–5.1)
PROT SERPL-MCNC: 7.5 G/DL (ref 6.4–8.2)
RBC # BLD AUTO: 5.21 M/UL (ref 4.1–5.7)
SODIUM SERPL-SCNC: 136 MMOL/L (ref 136–145)
WBC # BLD AUTO: 7 K/UL (ref 4.1–11.1)

## 2022-08-29 PROCEDURE — 74011000636 HC RX REV CODE- 636: Performed by: EMERGENCY MEDICINE

## 2022-08-29 PROCEDURE — 85025 COMPLETE CBC W/AUTO DIFF WBC: CPT

## 2022-08-29 PROCEDURE — 36415 COLL VENOUS BLD VENIPUNCTURE: CPT

## 2022-08-29 PROCEDURE — 80053 COMPREHEN METABOLIC PANEL: CPT

## 2022-08-29 PROCEDURE — 74174 CTA ABD&PLVS W/CONTRAST: CPT

## 2022-08-29 PROCEDURE — 71275 CT ANGIOGRAPHY CHEST: CPT

## 2022-08-29 PROCEDURE — 96375 TX/PRO/DX INJ NEW DRUG ADDON: CPT

## 2022-08-29 PROCEDURE — 99285 EMERGENCY DEPT VISIT HI MDM: CPT

## 2022-08-29 PROCEDURE — 74011250636 HC RX REV CODE- 250/636: Performed by: EMERGENCY MEDICINE

## 2022-08-29 PROCEDURE — 96374 THER/PROPH/DIAG INJ IV PUSH: CPT

## 2022-08-29 RX ORDER — HYDROCODONE BITARTRATE AND ACETAMINOPHEN 5; 325 MG/1; MG/1
1 TABLET ORAL
Qty: 10 TABLET | Refills: 0 | Status: SHIPPED | OUTPATIENT
Start: 2022-08-29 | End: 2022-09-01

## 2022-08-29 RX ORDER — LIDOCAINE 4 G/100G
PATCH TOPICAL
Qty: 5 PATCH | Refills: 0 | Status: SHIPPED | OUTPATIENT
Start: 2022-08-29

## 2022-08-29 RX ORDER — MORPHINE SULFATE 2 MG/ML
2 INJECTION, SOLUTION INTRAMUSCULAR; INTRAVENOUS
Status: COMPLETED | OUTPATIENT
Start: 2022-08-29 | End: 2022-08-29

## 2022-08-29 RX ORDER — ONDANSETRON 2 MG/ML
4 INJECTION INTRAMUSCULAR; INTRAVENOUS
Status: COMPLETED | OUTPATIENT
Start: 2022-08-29 | End: 2022-08-29

## 2022-08-29 RX ORDER — HYDROCODONE BITARTRATE AND ACETAMINOPHEN 7.5; 325 MG/1; MG/1
1 TABLET ORAL ONCE
Status: DISCONTINUED | OUTPATIENT
Start: 2022-08-29 | End: 2022-08-29

## 2022-08-29 RX ORDER — KETOROLAC TROMETHAMINE 30 MG/ML
15 INJECTION, SOLUTION INTRAMUSCULAR; INTRAVENOUS
Status: COMPLETED | OUTPATIENT
Start: 2022-08-29 | End: 2022-08-29

## 2022-08-29 RX ORDER — NAPROXEN 500 MG/1
500 TABLET ORAL
Qty: 20 TABLET | Refills: 0 | Status: SHIPPED | OUTPATIENT
Start: 2022-08-29

## 2022-08-29 RX ADMIN — MORPHINE SULFATE 2 MG: 2 INJECTION, SOLUTION INTRAMUSCULAR; INTRAVENOUS at 19:15

## 2022-08-29 RX ADMIN — IOPAMIDOL 100 ML: 755 INJECTION, SOLUTION INTRAVENOUS at 20:22

## 2022-08-29 RX ADMIN — KETOROLAC TROMETHAMINE 15 MG: 30 INJECTION, SOLUTION INTRAMUSCULAR; INTRAVENOUS at 19:14

## 2022-08-29 RX ADMIN — ONDANSETRON 4 MG: 2 INJECTION INTRAMUSCULAR; INTRAVENOUS at 19:42

## 2022-08-29 NOTE — Clinical Note
[facility] EMERGENCY DEPT  [address] [address] [phone]    Work/School Note    Date: 8/29/2022    To Whom It May concern:    Ervin Hadley was seen and treated today in the emergency room by the following provider(s):  Attending Provider: Yovani Ray MD.      Ervin Hadley is excused from work/school on 08/29/22 and 08/30/22. He is medically clear to return to work/school on 8/31/2022.        Sincerely,          Deny Arevalo MD

## 2022-08-29 NOTE — ED TRIAGE NOTES
Pt reports he was working on his car Saturday and felt a snap in the middle of his back while pulling a wrench. Pain increasingly gotten worse. Pain with breathing.

## 2022-08-30 NOTE — ED NOTES
Discharge instructions were given to the patient by Marco Edwards RN. The patient left the Emergency Department ambulatory, alert and oriented and in no acute distress with three prescriptions. The patient was encouraged to call or return to the ED for worsening issues or problems and was encouraged to schedule a follow up appointment for continuing care. The patient verbalized understanding of discharge instructions and prescriptions, all questions were answered. The patient has no further concerns at this time.

## 2022-08-30 NOTE — ED PROVIDER NOTES
EMERGENCY DEPARTMENT HISTORY AND PHYSICAL EXAM      Date: 8/29/2022  Patient Name: Gregoria Tran    History of Presenting Illness     Chief Complaint   Patient presents with    Back Pain       History Provided By: Patient    HPI: Gregoria Tran, 63 y.o. male with PMHx significant for diabetes, hypertension, who presents with a chief complaint of acute onset back pain that began 2 days ago. Patient states he was changing the brakes on his car and was pulling on a wrench and heard a \"snap\" in his back. Initially was okay but states he has had progressively worsening generalized back pain since then. He states he has felt nauseous secondary to the pain. He has tried taking Tylenol without relief. He denies fever, abdominal pain, nausea, vomiting. No incontinence or saddle anesthesia. PCP: Gail Betancourt MD    There are no other complaints, changes, or physical findings at this time. Current Outpatient Medications   Medication Sig Dispense Refill    HYDROcodone-acetaminophen (Norco) 5-325 mg per tablet Take 1 Tablet by mouth every four (4) hours as needed for Pain for up to 3 days. Max Daily Amount: 6 Tablets. 10 Tablet 0    naproxen (NAPROSYN) 500 mg tablet Take 1 Tablet by mouth every twelve (12) hours as needed for Pain. 20 Tablet 0    lidocaine 4 % patch Apply one patch to affected area for 12 hours and the remove for 12 hours 5 Patch 0    lisinopriL (PRINIVIL, ZESTRIL) 5 mg tablet TAKE 1/2 TABLET DAILY 90 Tablet 3    gabapentin (NEURONTIN) 600 mg tablet Take 1 Tablet by mouth three (3) times daily for 90 days. Max Daily Amount: 1,800 mg. 270 Tablet 0    insulin lispro (HUMALOG) 100 unit/mL injection Sliding scale; For -200 Add 2 units, 201-250 Add 4 units, 251-300 Add 6 units, 301-350 Add 8 units -400 Add 10 units 6 mL 5    DULoxetine (CYMBALTA) 30 mg capsule Take 1 Capsule by mouth two (2) times a day.  180 Capsule 3    flash glucose scanning reader (FreeStyle Cesia 2 [place]) misc 1 UNSPECIFIED by Does Not Apply route daily. 1 Each 0    flash glucose sensor (FreeStyle Cesia 2 Sensor) kit Use to check sugar multiple days daily as directed. 2 Kit 5    insulin glargine (LANTUS,BASAGLAR) 100 unit/mL (3 mL) inpn 40 Units by SubCUTAneous route nightly. 36 mL 3    pantoprazole (PROTONIX) 40 mg tablet Take 1 Tablet by mouth daily. 90 Tablet 3    Insulin Needles, Disposable, (Tamia Pen Needle) 32 gauge x 5/32\" ndle Use to inject insulin as directed. 100 Pen Needle 1    metFORMIN (GLUCOPHAGE) 1,000 mg tablet Take 1 Tablet by mouth two (2) times daily (with meals). 180 Tablet 1    insulin syr/ndl U100 half sirena 0.3 mL 31 gauge x 5/16\" syrg Use one syringe per insulin injection 100 Syringe 11    Blood-Glucose Meter monitoring kit Check blood sugar daily 1 Kit 0    lancets misc Use one new lancet per blood glucose check 1 Each 11    glucose blood VI test strips (ASCENSIA AUTODISC VI, ONE TOUCH ULTRA TEST VI) strip Use one new test strip per blood glucose check 100 Strip 0    atorvastatin (LIPITOR) 40 mg tablet Take 1 Tablet by mouth daily. (Patient not taking: Reported on 8/29/2022) 90 Tablet 3    empagliflozin (JARDIANCE) 10 mg tablet Take 1 Tablet by mouth daily. (Patient not taking: Reported on 8/29/2022) 90 Tablet 3    levothyroxine (SYNTHROID) 112 mcg tablet Take 1 Tablet by mouth Daily (before breakfast).  (Patient not taking: Reported on 8/29/2022) 90 Tablet 3     Past History     Past Medical History:  Past Medical History:   Diagnosis Date    Diabetes ([address]     Diverticulosis     Neuropathy     Thyroid disease      Past Surgical History:  Past Surgical History:   Procedure Laterality Date    IR CHOLECYSTOSTOMY PERCUTANEOUS  2010    MN CARDIAC SURG PROCEDURE UNLIST  2010    Stint     Family History:  Family History   Problem Relation Age of Onset    Heart Disease Mother     Cancer Mother     Cancer Father     Cancer Sister      Social History:  Social History     Tobacco Use    Smoking status: Former     Years: 20.00     Types: Cigarettes    Smokeless tobacco: Never    Tobacco comments:     Quit 25 years ago   Vaping Use    Vaping Use: Never used   Substance Use Topics    Alcohol use: Not Currently    Drug use: Not Currently     Allergies:  No Known Allergies  Review of Systems   Review of Systems   Constitutional:  Negative for chills and fever. HENT:  Negative for congestion, rhinorrhea and sore throat. Respiratory:  Negative for cough and shortness of breath. Cardiovascular:  Negative for chest pain. Gastrointestinal:  Negative for abdominal pain, nausea and vomiting. Genitourinary:  Negative for dysuria and urgency. Musculoskeletal:  Positive for back pain. Skin:  Negative for rash. Neurological:  Negative for dizziness, light-headedness and headaches. All other systems reviewed and are negative. Physical Exam   Physical Exam  Vitals and nursing note reviewed. Constitutional:       General: He is not in acute distress. Appearance: He is well-developed. HENT:      Head: Normocephalic and atraumatic. Eyes:      Conjunctiva/sclera: Conjunctivae normal.      Pupils: Pupils are equal, round, and reactive to light. Cardiovascular:      Rate and Rhythm: Normal rate and regular rhythm. Pulmonary:      Effort: Pulmonary effort is normal. No respiratory distress. Breath sounds: Normal breath sounds. No stridor. Abdominal:      General: There is no distension. Palpations: Abdomen is soft. Tenderness: There is no abdominal tenderness. Musculoskeletal:         General: Normal range of motion. Cervical back: Normal range of motion. Skin:     General: Skin is warm and dry. Neurological:      Mental Status: He is alert and oriented to person, place, and time. Psychiatric:         Mood and Affect: Mood normal.         Thought Content:  Thought content normal.     Diagnostic Study Results   Labs -     Recent Results (from the past 12 hour(s))   CBC WITH AUTOMATED DIFF    Collection Time: 08/29/22  7:09 PM   Result Value Ref Range    WBC 7.0 4.1 - 11.1 K/uL    RBC 5.21 4.10 - 5.70 M/uL    HGB 16.7 12.1 - 17.0 g/dL    HCT 48.0 36.6 - 50.3 %    MCV 92.1 80.0 - 99.0 FL    MCH 32.1 26.0 - 34.0 PG    MCHC 34.8 30.0 - 36.5 g/dL    RDW 12.4 11.5 - 14.5 %    PLATELET 317 672 - 509 K/uL    MPV 9.1 8.9 - 12.9 FL    NRBC 0.0 0  WBC    ABSOLUTE NRBC 0.00 0.00 - 0.01 K/uL    NEUTROPHILS 75 32 - 75 %    LYMPHOCYTES 14 12 - 49 %    MONOCYTES 8 5 - 13 %    EOSINOPHILS 2 0 - 7 %    BASOPHILS 1 0 - 1 %    IMMATURE GRANULOCYTES 0 0.0 - 0.5 %    ABS. NEUTROPHILS 5.3 1.8 - 8.0 K/UL    ABS. LYMPHOCYTES 1.0 0.8 - 3.5 K/UL    ABS. MONOCYTES 0.6 0.0 - 1.0 K/UL    ABS. EOSINOPHILS 0.1 0.0 - 0.4 K/UL    ABS. BASOPHILS 0.1 0.0 - 0.1 K/UL    ABS. IMM. GRANS. 0.0 0.00 - 0.04 K/UL    DF AUTOMATED     METABOLIC PANEL, COMPREHENSIVE    Collection Time: 08/29/22  7:09 PM   Result Value Ref Range    Sodium 136 136 - 145 mmol/L    Potassium 3.9 3.5 - 5.1 mmol/L    Chloride 102 97 - 108 mmol/L    CO2 24 21 - 32 mmol/L    Anion gap 10 5 - 15 mmol/L    Glucose 222 (H) 65 - 100 mg/dL    BUN 22 (H) 6 - 20 MG/DL    Creatinine 1.07 0.70 - 1.30 MG/DL    BUN/Creatinine ratio 21 (H) 12 - 20      GFR est AA >60 >60 ml/min/1.73m2    GFR est non-AA >60 >60 ml/min/1.73m2    Calcium 9.4 8.5 - 10.1 MG/DL    Bilirubin, total 0.5 0.2 - 1.0 MG/DL    ALT (SGPT) 31 12 - 78 U/L    AST (SGOT) 15 15 - 37 U/L    Alk. phosphatase 70 45 - 117 U/L    Protein, total 7.5 6.4 - 8.2 g/dL    Albumin 3.8 3.5 - 5.0 g/dL    Globulin 3.7 2.0 - 4.0 g/dL    A-G Ratio 1.0 (L) 1.1 - 2.2         Radiologic Studies -   CTA CHEST W OR W WO CONT   Final Result      1. No acute aortic/vascular abnormality. No evidence of aortic aneurysm or   dissection. 2. Mild atherosclerosis of the aorta and branch vessels. 3. Clear lungs. 4. Moderate generalized constipation. 5. No acute osseous abnormality.          CTA ABDOMEN PELV W CONT Final Result      1. No acute aortic/vascular abnormality. No evidence of aortic aneurysm or   dissection. 2. Mild atherosclerosis of the aorta and branch vessels. 3. Clear lungs. 4. Moderate generalized constipation. 5. No acute osseous abnormality. CTA CHEST W OR W WO CONT    Result Date: 8/29/2022  1. No acute aortic/vascular abnormality. No evidence of aortic aneurysm or dissection. 2. Mild atherosclerosis of the aorta and branch vessels. 3. Clear lungs. 4. Moderate generalized constipation. 5. No acute osseous abnormality. CTA ABDOMEN PELV W CONT    Result Date: 8/29/2022  1. No acute aortic/vascular abnormality. No evidence of aortic aneurysm or dissection. 2. Mild atherosclerosis of the aorta and branch vessels. 3. Clear lungs. 4. Moderate generalized constipation. 5. No acute osseous abnormality. Medical Decision Making   I am the first provider for this patient. I reviewed the vital signs, available nursing notes, past medical history, past surgical history, family history and social history. Vital Signs-Reviewed the patient's vital signs. Patient Vitals for the past 12 hrs:   Temp Pulse Resp BP SpO2   08/29/22 1834 98.3 °F (36.8 °C) 92 20 135/84 98 %       Pulse Oximetry Analysis - 98% on ra      Records Reviewed: Nursing Notes and Old Medical Records    Provider Notes (Medical Decision Making):   Patient presents with a chief complaint of acute onset back pain. Symptoms sound most likely musculoskeletal in nature given description of onset of symptoms, however I am unable to reproduce any specific point tenderness on exam so will check imaging to rule out other causes of back pain including dissection though I still think musculoskeletal pain is more likely. ED Course:   Initial assessment performed. The patients presenting problems have been discussed, and they are in agreement with the care plan formulated and outlined with them.   I have encouraged them to ask questions as they arise throughout their visit. Lab work and imaging unremarkable. Will discharge with a short course of narcotic pain medication given severity of symptoms. He was encouraged to follow-up with PCP. Return precautions discussed. Procedures:  Procedures    Critical Care:  none    Disposition:  Discharge Note:  The patient has been re-evaluated and is ready for discharge. Reviewed available results with patient. Counseled patient on diagnosis and care plan. Patient has expressed understanding, and all questions have been answered. Patient agrees with plan and agrees to follow up as recommended, or to return to the ED if their symptoms worsen. Discharge instructions have been provided and explained to the patient, along with reasons to return to the ED. PLAN:  1. Discharge Medication List as of 8/29/2022  8:53 PM        START taking these medications    Details   HYDROcodone-acetaminophen (Norco) 5-325 mg per tablet Take 1 Tablet by mouth every four (4) hours as needed for Pain for up to 3 days. Max Daily Amount: 6 Tablets., Normal, Disp-10 Tablet, R-0      naproxen (NAPROSYN) 500 mg tablet Take 1 Tablet by mouth every twelve (12) hours as needed for Pain., Normal, Disp-20 Tablet, R-0      lidocaine 4 % patch Apply one patch to affected area for 12 hours and the remove for 12 hours, Normal, Disp-5 Patch, R-0           CONTINUE these medications which have NOT CHANGED    Details   lisinopriL (PRINIVIL, ZESTRIL) 5 mg tablet TAKE 1/2 TABLET DAILY, Normal, Disp-90 Tablet, R-3      gabapentin (NEURONTIN) 600 mg tablet Take 1 Tablet by mouth three (3) times daily for 90 days. Max Daily Amount: 1,800 mg., Normal, Disp-270 Tablet, R-0      insulin lispro (HUMALOG) 100 unit/mL injection Sliding scale;  For -200 Add 2 units, 201-250 Add 4 units, 251-300 Add 6 units, 301-350 Add 8 units -400 Add 10 units, Normal, Disp-6 mL, R-5      DULoxetine (CYMBALTA) 30 mg capsule Take 1 Capsule by mouth two (2) times a day., Normal, Disp-180 Capsule, R-3      flash glucose scanning reader (FreeStyle Cesia 2 [place]) misc 1 UNSPECIFIED by Does Not Apply route daily. , Normal, Disp-1 Each, R-0      flash glucose sensor (FreeStyle Cesia 2 Sensor) kit Use to check sugar multiple days daily as directed., Normal, Disp-2 Kit, R-5      insulin glargine (LANTUS,BASAGLAR) 100 unit/mL (3 mL) inpn 40 Units by SubCUTAneous route nightly., Normal, Disp-36 mL, R-3      pantoprazole (PROTONIX) 40 mg tablet Take 1 Tablet by mouth daily. , Normal, Disp-90 Tablet, R-3      Insulin Needles, Disposable, (Tamia Pen Needle) 32 gauge x 5/32\" ndle Use to inject insulin as directed., No Print, Disp-100 Pen Needle, R-1      metFORMIN (GLUCOPHAGE) 1,000 mg tablet Take 1 Tablet by mouth two (2) times daily (with meals). , Normal, Disp-180 Tablet, R-1      insulin syr/ndl U100 half sirena 0.3 mL 31 gauge x 5/16\" syrg Use one syringe per insulin injection, Normal, Disp-100 Syringe, R-11      Blood-Glucose Meter monitoring kit Check blood sugar daily, Normal, Disp-1 Kit, R-0      lancets misc Use one new lancet per blood glucose check, Normal, Disp-1 Each, R-11      glucose blood VI test strips (ASCENSIA AUTODISC VI, ONE TOUCH ULTRA TEST VI) strip Use one new test strip per blood glucose check, Normal, Disp-100 Strip, R-0      atorvastatin (LIPITOR) 40 mg tablet Take 1 Tablet by mouth daily. , Normal, Disp-90 Tablet, R-3      empagliflozin (JARDIANCE) 10 mg tablet Take 1 Tablet by mouth daily. , Normal, Disp-90 Tablet, R-3      levothyroxine (SYNTHROID) 112 mcg tablet Take 1 Tablet by mouth Daily (before breakfast). , Normal, Disp-90 Tablet, R-3           2.    Follow-up Information       Follow up With Specialties Details Why Contact Info    Nilton Cohen MD Family Medicine Schedule an appointment as soon as possible for a visit   [address] [facility] [address] [phone]      [facility] EMERGENCY DEPT Emergency Medicine  As needed, If symptoms worsen [address] [facility]  [phone]          Return to ED if worse     Diagnosis     Clinical Impression:   1. Acute bilateral thoracic back pain            Please note that this dictation was completed with [employer], the computer voice recognition software. Quite often unanticipated grammatical, syntax, homophones, and other interpretive errors are inadvertently transcribed by the computer software. Please disregard these errors.   Please excuse any errors that have escaped final proofreading

## 2022-08-31 ENCOUNTER — TELEPHONE (OUTPATIENT)
Dept: FAMILY MEDICINE CLINIC | Age: 62
End: 2022-08-31

## 2022-08-31 NOTE — TELEPHONE ENCOUNTER
----- Message from Nicki Gant sent at 8/31/2022  3:06 PM EDT -----  Subject: Message to Provider    QUESTIONS  Information for Provider? Pt returning call from office to schedule appt   for Established Patient Appointment needed? Urgent Back Neck Pain.  Please   call with available appt.   ---------------------------------------------------------------------------  --------------  Heather HINES  [phone]; OK to respond with electronic message via [email or web address] portal (only   for patients who have registered [email or web address] account)  ---------------------------------------------------------------------------  --------------  SCRIPT ANSWERS  undefined

## 2022-09-01 ENCOUNTER — NURSE TRIAGE (OUTPATIENT)
Dept: OTHER | Facility: CLINIC | Age: 62
End: 2022-09-01

## 2022-09-01 ENCOUNTER — DOCUMENTATION ONLY (OUTPATIENT)
Dept: FAMILY MEDICINE CLINIC | Age: 62
End: 2022-09-01

## 2022-09-01 ENCOUNTER — HOSPITAL ENCOUNTER (EMERGENCY)
Age: 62
Discharge: HOME | End: 2022-09-01
Attending: EMERGENCY MEDICINE
Payer: COMMERCIAL

## 2022-09-01 ENCOUNTER — APPOINTMENT (OUTPATIENT)
Dept: GENERAL RADIOLOGY | Age: 62
End: 2022-09-01
Attending: EMERGENCY MEDICINE
Payer: COMMERCIAL

## 2022-09-01 VITALS
TEMPERATURE: 97.9 F | RESPIRATION RATE: 17 BRPM | HEART RATE: 94 BPM | OXYGEN SATURATION: 98 % | SYSTOLIC BLOOD PRESSURE: 150 MMHG | DIASTOLIC BLOOD PRESSURE: 85 MMHG | HEIGHT: 72 IN | BODY MASS INDEX: 22.48 KG/M2 | WEIGHT: 166 LBS

## 2022-09-01 DIAGNOSIS — K31.84 GASTROPARESIS: ICD-10-CM

## 2022-09-01 DIAGNOSIS — R11.2 NAUSEA AND VOMITING, UNSPECIFIED VOMITING TYPE: Primary | ICD-10-CM

## 2022-09-01 LAB
ANION GAP SERPL CALC-SCNC: 18 MMOL/L (ref 5–15)
BASOPHILS # BLD: 0.1 K/UL (ref 0–0.1)
BASOPHILS NFR BLD: 1 % (ref 0–1)
BUN SERPL-MCNC: 24 MG/DL (ref 6–20)
BUN/CREAT SERPL: 20 (ref 12–20)
CALCIUM SERPL-MCNC: 9.3 MG/DL (ref 8.5–10.1)
CHLORIDE SERPL-SCNC: 100 MMOL/L (ref 97–108)
CO2 SERPL-SCNC: 19 MMOL/L (ref 21–32)
CREAT SERPL-MCNC: 1.23 MG/DL (ref 0.7–1.3)
DIFFERENTIAL METHOD BLD: ABNORMAL
EOSINOPHIL # BLD: 0.1 K/UL (ref 0–0.4)
EOSINOPHIL NFR BLD: 1 % (ref 0–7)
ERYTHROCYTE [DISTWIDTH] IN BLOOD BY AUTOMATED COUNT: 12.3 % (ref 11.5–14.5)
GLUCOSE BLD STRIP.AUTO-MCNC: 280 MG/DL (ref 65–117)
GLUCOSE SERPL-MCNC: 308 MG/DL (ref 65–100)
HCT VFR BLD AUTO: 50.6 % (ref 36.6–50.3)
HGB BLD-MCNC: 17.4 G/DL (ref 12.1–17)
IMM GRANULOCYTES # BLD AUTO: 0.1 K/UL (ref 0–0.04)
IMM GRANULOCYTES NFR BLD AUTO: 1 % (ref 0–0.5)
LACTATE SERPL-SCNC: 1.3 MMOL/L (ref 0.4–2)
LACTATE SERPL-SCNC: 3.5 MMOL/L (ref 0.4–2)
LYMPHOCYTES # BLD: 1.5 K/UL (ref 0.8–3.5)
LYMPHOCYTES NFR BLD: 14 % (ref 12–49)
MCH RBC QN AUTO: 31 PG (ref 26–34)
MCHC RBC AUTO-ENTMCNC: 34.4 G/DL (ref 30–36.5)
MCV RBC AUTO: 90.2 FL (ref 80–99)
MONOCYTES # BLD: 0.4 K/UL (ref 0–1)
MONOCYTES NFR BLD: 4 % (ref 5–13)
NEUTS SEG # BLD: 8.6 K/UL (ref 1.8–8)
NEUTS SEG NFR BLD: 79 % (ref 32–75)
NRBC # BLD: 0 K/UL (ref 0–0.01)
NRBC BLD-RTO: 0 PER 100 WBC
PLATELET # BLD AUTO: 249 K/UL (ref 150–400)
PMV BLD AUTO: 9.2 FL (ref 8.9–12.9)
POTASSIUM SERPL-SCNC: 3.4 MMOL/L (ref 3.5–5.1)
RBC # BLD AUTO: 5.61 M/UL (ref 4.1–5.7)
SERVICE CMNT-IMP: ABNORMAL
SODIUM SERPL-SCNC: 137 MMOL/L (ref 136–145)
WBC # BLD AUTO: 10.7 K/UL (ref 4.1–11.1)

## 2022-09-01 PROCEDURE — 82962 GLUCOSE BLOOD TEST: CPT

## 2022-09-01 PROCEDURE — 83605 ASSAY OF LACTIC ACID: CPT

## 2022-09-01 PROCEDURE — 74011250637 HC RX REV CODE- 250/637: Performed by: EMERGENCY MEDICINE

## 2022-09-01 PROCEDURE — 36415 COLL VENOUS BLD VENIPUNCTURE: CPT

## 2022-09-01 PROCEDURE — 93005 ELECTROCARDIOGRAM TRACING: CPT

## 2022-09-01 PROCEDURE — 85025 COMPLETE CBC W/AUTO DIFF WBC: CPT

## 2022-09-01 PROCEDURE — 96375 TX/PRO/DX INJ NEW DRUG ADDON: CPT

## 2022-09-01 PROCEDURE — 96361 HYDRATE IV INFUSION ADD-ON: CPT

## 2022-09-01 PROCEDURE — 99285 EMERGENCY DEPT VISIT HI MDM: CPT

## 2022-09-01 PROCEDURE — 71045 X-RAY EXAM CHEST 1 VIEW: CPT

## 2022-09-01 PROCEDURE — 96374 THER/PROPH/DIAG INJ IV PUSH: CPT

## 2022-09-01 PROCEDURE — 80048 BASIC METABOLIC PNL TOTAL CA: CPT

## 2022-09-01 PROCEDURE — 74011250636 HC RX REV CODE- 250/636: Performed by: EMERGENCY MEDICINE

## 2022-09-01 RX ORDER — HYDROMORPHONE HYDROCHLORIDE 1 MG/ML
1 INJECTION, SOLUTION INTRAMUSCULAR; INTRAVENOUS; SUBCUTANEOUS ONCE
Status: COMPLETED | OUTPATIENT
Start: 2022-09-01 | End: 2022-09-01

## 2022-09-01 RX ORDER — PROCHLORPERAZINE EDISYLATE 5 MG/ML
10 INJECTION INTRAMUSCULAR; INTRAVENOUS
Status: COMPLETED | OUTPATIENT
Start: 2022-09-01 | End: 2022-09-01

## 2022-09-01 RX ORDER — SODIUM CHLORIDE 9 MG/ML
1000 INJECTION, SOLUTION INTRAVENOUS ONCE
Status: COMPLETED | OUTPATIENT
Start: 2022-09-01 | End: 2022-09-01

## 2022-09-01 RX ORDER — POTASSIUM CHLORIDE 750 MG/1
40 TABLET, FILM COATED, EXTENDED RELEASE ORAL
Status: COMPLETED | OUTPATIENT
Start: 2022-09-01 | End: 2022-09-01

## 2022-09-01 RX ORDER — ONDANSETRON 2 MG/ML
4 INJECTION INTRAMUSCULAR; INTRAVENOUS
Status: COMPLETED | OUTPATIENT
Start: 2022-09-01 | End: 2022-09-01

## 2022-09-01 RX ORDER — ONDANSETRON 4 MG/1
4 TABLET, FILM COATED ORAL
Qty: 20 TABLET | Refills: 0 | Status: SHIPPED | OUTPATIENT
Start: 2022-09-01

## 2022-09-01 RX ORDER — KETOROLAC TROMETHAMINE 30 MG/ML
15 INJECTION, SOLUTION INTRAMUSCULAR; INTRAVENOUS
Status: COMPLETED | OUTPATIENT
Start: 2022-09-01 | End: 2022-09-01

## 2022-09-01 RX ADMIN — SODIUM CHLORIDE 1000 ML: 9 INJECTION, SOLUTION INTRAVENOUS at 11:14

## 2022-09-01 RX ADMIN — ONDANSETRON 4 MG: 2 INJECTION INTRAMUSCULAR; INTRAVENOUS at 09:50

## 2022-09-01 RX ADMIN — KETOROLAC TROMETHAMINE 15 MG: 30 INJECTION, SOLUTION INTRAMUSCULAR; INTRAVENOUS at 11:13

## 2022-09-01 RX ADMIN — SODIUM CHLORIDE 1000 ML: 9 INJECTION, SOLUTION INTRAVENOUS at 09:52

## 2022-09-01 RX ADMIN — PROCHLORPERAZINE EDISYLATE 10 MG: 5 INJECTION INTRAMUSCULAR; INTRAVENOUS at 10:13

## 2022-09-01 RX ADMIN — HYDROMORPHONE HYDROCHLORIDE 1 MG: 1 INJECTION, SOLUTION INTRAMUSCULAR; INTRAVENOUS; SUBCUTANEOUS at 09:52

## 2022-09-01 RX ADMIN — POTASSIUM CHLORIDE 40 MEQ: 750 TABLET, EXTENDED RELEASE ORAL at 11:12

## 2022-09-01 NOTE — TELEPHONE ENCOUNTER
Received call from [place] at [facility] with Red Flag Complaint. Subjective: Caller states \"I am having back pain\"     Current Symptoms: Pain in middle of his back. Working on vehicle, pulling on a bolt and then his back snapped. Had to lay on his back immediately and lost his breath at that time. Went to ER for the back injury on 8/29/22. Had a CT scan and given pain meds. Never got a diagnosis. Has vomited x5 this AM and with nausea due to the pain. Breathing in is making the pain worse. Movement causes sharp shocking pain. Onset: 4 days ago; worsening    Associated Symptoms: reduced activity, reduced appetite, increased wakefulness    Pain Severity: 9/10; aching; constant, severe    Temperature: denies fever     What has been tried: nothing    LMP: NA Pregnant: NA    Recommended disposition: Go to Office Now    Care advice provided, patient verbalizes understanding; denies any other questions or concerns; instructed to call back for any new or worsening symptoms. Patient/Caller agrees with recommended disposition; writer provided warm transfer to [employer] at [facility] for appointment scheduling    Attention Provider: Thank you for allowing me to participate in the care of your patient. The patient was connected to triage in response to information provided to the [facility]. Please do not respond through this encounter as the response is not directed to a shared pool.       Reason for Disposition   SEVERE back pain (e.g., excruciating, unable to do any normal activities) and not improved after pain medicine and CARE ADVICE    Protocols used: Back Injury-ADULT-[place]

## 2022-09-01 NOTE — ED NOTES
Pt presents to ED ambulatory complaining of mid sternal chest pain x 1 week. Pt states he was working on his car 1 week ago when he injured his back. Pt states his back pain has worsened and now radiates to his mid chest. Pt denies any SOB or cardiac hx. Pt tachyphemic during assessment and squirming in pain. Pt reports nausea but denies an episode of vomiting. Pt dry heaving into emesis bag. Pt is alert and oriented x 4, skin is warm and dry. Assessment completed and pt updated on plan of care. Call bell in reach. Emergency Department Nursing Plan of Care       The Nursing Plan of Care is developed from the Nursing assessment and Emergency Department Attending provider initial evaluation. The plan of care may be reviewed in the ED Provider note.     The Plan of Care was developed with the following considerations:   Patient / Family readiness to learn indicated by:verbalized understanding  Persons(s) to be included in education: patient  Barriers to Learning/Limitations:No    Signed     Efraín Peterson RN    9/1/2022   11:51 AM

## 2022-09-01 NOTE — PROGRESS NOTES
Patient called stating he wasn't going to keep waiting for a call back and decided to go to the hospital. I informed him the nurse called him and he didn't answer. He declined to send another message or schedule and appt.

## 2022-09-01 NOTE — ED NOTES
Discharge instructions were given to the patient by Brittany Perales RN. The patient left the Emergency Department ambulatory, alert and oriented and in no acute distress with 1 prescriptions. The patient was encouraged to call or return to the ED for worsening issues or problems and was encouraged to schedule a follow up appointment for continuing care. The patient verbalized understanding of discharge instructions and prescriptions, all questions were answered. The patient has no further concerns at this time.

## 2022-09-01 NOTE — TELEPHONE ENCOUNTER
Call from [place] at [facility] ([facility]) who patient was transferred to from RN after RN Triage, to book appt within 3-4 at PCP office. [place] was unable to find him an appt within recommended disposition time frame. She stated she tried to reach  [facility] and was unsuccessful    Patient transferred back to RN he spoke to this AM from [facility]). RN contacted PCP office and was able to get thru. Transferred him to [facility] for appt scheduling and/or further care advice.

## 2022-09-01 NOTE — ED PROVIDER NOTES
EMERGENCY DEPARTMENT HISTORY AND PHYSICAL EXAM      Date: 9/1/2022  Patient Name: Melody Garcia    History of Presenting Illness     Chief Complaint   Patient presents with    Chest Pain       History Provided By: Patient    HPI: Meoldy Garcia, 63 y.o. male presents to the ED with cc of nausea vomiting, upper back pain radiating to the chest since this past Sunday. He was seen in this emergency department 3 days ago for the same, had a full work-up and discharged home. Since yesterday patient started vomiting more in the patient was been getting worse. Patient denies alcohol or drug use. There are no other associated symptoms, patient concerns, or physical findings at this time. I reviewed the vital signs, available nursing notes, past medical history, past surgical history, family history and social history. Vital Signs:  Patient Vitals for the past 12 hrs:   Temp Pulse Resp BP SpO2   09/01/22 0942 97.9 °F (36.6 °C) 96 (!) 32 (!) 135/90 100 %     Vital signs reviewed. Current Medications:  No current facility-administered medications on file prior to encounter. Current Outpatient Medications on File Prior to Encounter   Medication Sig Dispense Refill    HYDROcodone-acetaminophen (Norco) 5-325 mg per tablet Take 1 Tablet by mouth every four (4) hours as needed for Pain for up to 3 days. Max Daily Amount: 6 Tablets. 10 Tablet 0    naproxen (NAPROSYN) 500 mg tablet Take 1 Tablet by mouth every twelve (12) hours as needed for Pain. 20 Tablet 0    lidocaine 4 % patch Apply one patch to affected area for 12 hours and the remove for 12 hours 5 Patch 0    atorvastatin (LIPITOR) 40 mg tablet Take 1 Tablet by mouth daily. (Patient not taking: Reported on 8/29/2022) 90 Tablet 3    lisinopriL (PRINIVIL, ZESTRIL) 5 mg tablet TAKE 1/2 TABLET DAILY 90 Tablet 3    gabapentin (NEURONTIN) 600 mg tablet Take 1 Tablet by mouth three (3) times daily for 90 days.  Max Daily Amount: 1,800 mg. 270 Tablet 0    insulin lispro (HUMALOG) 100 unit/mL injection Sliding scale; For -200 Add 2 units, 201-250 Add 4 units, 251-300 Add 6 units, 301-350 Add 8 units -400 Add 10 units 6 mL 5    empagliflozin (JARDIANCE) 10 mg tablet Take 1 Tablet by mouth daily. (Patient not taking: Reported on 8/29/2022) 90 Tablet 3    levothyroxine (SYNTHROID) 112 mcg tablet Take 1 Tablet by mouth Daily (before breakfast). (Patient not taking: Reported on 8/29/2022) 90 Tablet 3    DULoxetine (CYMBALTA) 30 mg capsule Take 1 Capsule by mouth two (2) times a day. 180 Capsule 3    flash glucose scanning reader (FreeStyle Cesia 2 [place]) misc 1 UNSPECIFIED by Does Not Apply route daily. 1 Each 0    flash glucose sensor (FreeStyle Cesia 2 Sensor) kit Use to check sugar multiple days daily as directed. 2 Kit 5    insulin glargine (LANTUS,BASAGLAR) 100 unit/mL (3 mL) inpn 40 Units by SubCUTAneous route nightly. 36 mL 3    pantoprazole (PROTONIX) 40 mg tablet Take 1 Tablet by mouth daily. 90 Tablet 3    Insulin Needles, Disposable, (Tamia Pen Needle) 32 gauge x 5/32\" ndle Use to inject insulin as directed. 100 Pen Needle 1    metFORMIN (GLUCOPHAGE) 1,000 mg tablet Take 1 Tablet by mouth two (2) times daily (with meals).  180 Tablet 1    insulin syr/ndl U100 half sirena 0.3 mL 31 gauge x 5/16\" syrg Use one syringe per insulin injection 100 Syringe 11    Blood-Glucose Meter monitoring kit Check blood sugar daily 1 Kit 0    lancets misc Use one new lancet per blood glucose check 1 Each 11    glucose blood VI test strips (ASCENSIA AUTODISC VI, ONE TOUCH ULTRA TEST VI) strip Use one new test strip per blood glucose check 100 Strip 0       Past History     Past Medical History:  Past Medical History:   Diagnosis Date    Diabetes ([address]     Diverticulosis     Neuropathy     Thyroid disease        Past Surgical History:  Past Surgical History:   Procedure Laterality Date    IR CHOLECYSTOSTOMY PERCUTANEOUS  2010    SC CARDIAC SURG PROCEDURE UNLIST  2010    Stint Family History:  Family History   Problem Relation Age of Onset    Heart Disease Mother     Cancer Mother     Cancer Father     Cancer Sister        Social History:  Social History     Tobacco Use    Smoking status: Former     Years: 20.00     Types: Cigarettes    Smokeless tobacco: Never    Tobacco comments:     Quit 25 years ago   Vaping Use    Vaping Use: Never used   Substance Use Topics    Alcohol use: Not Currently    Drug use: Not Currently       Allergies:  No Known Allergies      Review of Systems   Review of Systems   Constitutional:  Negative for fever. HENT:  Negative for sore throat and trouble swallowing. Eyes:  Negative for photophobia and redness. Respiratory:  Negative for cough and shortness of breath. Cardiovascular:  Positive for chest pain. Negative for leg swelling. Gastrointestinal:  Positive for nausea and vomiting. Negative for abdominal pain, constipation and diarrhea. Endocrine: Negative for polydipsia and polyuria. Genitourinary:  Negative for dysuria, hematuria and scrotal swelling. Musculoskeletal:  Positive for back pain. Negative for joint swelling. Skin:  Negative for rash. Neurological:  Negative for dizziness, syncope, weakness and headaches. Psychiatric/Behavioral:  Negative for suicidal ideas. All other systems reviewed and are negative. Physical Exam   Physical Exam  Vitals and nursing note reviewed. Constitutional:       General: He is in acute distress. Appearance: He is well-developed. Comments: Patient is in acute distress due to nausea vomiting   HENT:      Head: Normocephalic and atraumatic. Mouth/Throat:      Pharynx: No oropharyngeal exudate. Eyes:      General:         Left eye: No discharge. Conjunctiva/sclera: Conjunctivae normal.      Pupils: Pupils are equal, round, and reactive to light. Neck:      Vascular: No JVD. Cardiovascular:      Rate and Rhythm: Normal rate and regular rhythm.       Heart sounds: Normal heart sounds. Pulmonary:      Effort: Pulmonary effort is normal. No respiratory distress. Breath sounds: Normal breath sounds. No wheezing. Abdominal:      General: Bowel sounds are normal. There is no distension. Palpations: Abdomen is soft. Tenderness: There is no abdominal tenderness. There is no guarding or rebound. Musculoskeletal:         General: No tenderness. Normal range of motion. Cervical back: Normal range of motion and neck supple. Lymphadenopathy:      Cervical: No cervical adenopathy. Skin:     General: Skin is warm and dry. Capillary Refill: Capillary refill takes less than 2 seconds. Findings: No rash. Neurological:      General: No focal deficit present. Mental Status: He is alert and oriented to person, place, and time. Cranial Nerves: No cranial nerve deficit. Deep Tendon Reflexes: Reflexes are normal and symmetric. Psychiatric:         Behavior: Behavior normal.       Emergency Department Course   ED Course:  Initial assessment performed. The patient's complaints have been discussed, and they are in agreement with the care plan formulated and outlined with them. I have encouraged them to ask questions as they arise throughout their visit. EKG interpretation: (Preliminary)  Rhythm: normal sinus rhythm; and regular . Rate (approx.): 85; Axis: normal; SC interval: normal; QRS interval: normal ; ST/T wave: normal; Other findings: borderline ekg. EKG read and interpreted by Deshawn Driver MD     Medical Decision Making:  Gastroparesis, atypical chest pain, esophageal rupture,aortic dissection. Critical Care Time:      Procedure:      Progress note:   Time:12:35 pm patient is feeling better, tolerating p.o. fluids. Disposition:  DISCHARGED at 1:00 pm,  I reviewed exam findings, diagnostic results, and clinical impression with patient. Counseled patient on diagnosis and care plan.  Encouraged patient to ask questions and discussed need for follow up with primary care and to return to ED precautions. Patient expresses understanding at this time. I have reviewed discharge instructions with the patient and/or family/caregiver who verbalized understanding. The patient has been re-evaluated and is ready for discharge. Discharge instructions have been provided and explained to the patient. Ready for discharge. DISCHARGE PLAN:  1. Current Discharge Medication List        2. Follow-up Information    None       3. Return to ED if current symptoms worsen or new symptoms arise. 4. Follow up with Bertha Shah MD in 3-5 days. Diagnosis     Clinical Impression: No diagnosis found. Right arm pain since last night. No injury

## 2022-09-01 NOTE — TELEPHONE ENCOUNTER
Attempted to call patient, did not leave message. Patient seen at ED [facility] 9/1/2022 for nausea and vomiting. Message read for neck and back pain.

## 2022-09-02 LAB
ATRIAL RATE: 85 BPM
CALCULATED P AXIS, ECG09: 75 DEGREES
CALCULATED R AXIS, ECG10: 65 DEGREES
CALCULATED T AXIS, ECG11: 77 DEGREES
DIAGNOSIS, 93000: NORMAL
P-R INTERVAL, ECG05: 162 MS
Q-T INTERVAL, ECG07: 366 MS
QRS DURATION, ECG06: 72 MS
QTC CALCULATION (BEZET), ECG08: 435 MS
VENTRICULAR RATE, ECG03: 85 BPM

## 2022-10-24 DIAGNOSIS — Z79.4 TYPE 2 DIABETES MELLITUS WITH DIABETIC POLYNEUROPATHY, WITH LONG-TERM CURRENT USE OF INSULIN (HCC): ICD-10-CM

## 2022-10-24 DIAGNOSIS — E11.42 TYPE 2 DIABETES MELLITUS WITH DIABETIC POLYNEUROPATHY, WITH LONG-TERM CURRENT USE OF INSULIN (HCC): ICD-10-CM

## 2022-10-25 RX ORDER — FLASH GLUCOSE SENSOR
KIT MISCELLANEOUS
Qty: 1 KIT | Refills: 5 | Status: SHIPPED | OUTPATIENT
Start: 2022-10-25

## 2022-11-22 ENCOUNTER — OFFICE VISIT (OUTPATIENT)
Dept: FAMILY MEDICINE CLINIC | Age: 62
End: 2022-11-22
Payer: COMMERCIAL

## 2022-11-22 VITALS
BODY MASS INDEX: 23.54 KG/M2 | WEIGHT: 173.8 LBS | TEMPERATURE: 97.5 F | RESPIRATION RATE: 18 BRPM | HEIGHT: 72 IN | SYSTOLIC BLOOD PRESSURE: 108 MMHG | DIASTOLIC BLOOD PRESSURE: 74 MMHG | HEART RATE: 70 BPM | OXYGEN SATURATION: 99 %

## 2022-11-22 DIAGNOSIS — N30.01 ACUTE CYSTITIS WITH HEMATURIA: Primary | ICD-10-CM

## 2022-11-22 DIAGNOSIS — Z79.4 TYPE 2 DIABETES MELLITUS WITH DIABETIC POLYNEUROPATHY, WITH LONG-TERM CURRENT USE OF INSULIN (HCC): ICD-10-CM

## 2022-11-22 DIAGNOSIS — E03.9 ACQUIRED HYPOTHYROIDISM: ICD-10-CM

## 2022-11-22 DIAGNOSIS — E78.2 MIXED HYPERLIPIDEMIA: ICD-10-CM

## 2022-11-22 DIAGNOSIS — E11.42 TYPE 2 DIABETES MELLITUS WITH DIABETIC POLYNEUROPATHY, WITH LONG-TERM CURRENT USE OF INSULIN (HCC): ICD-10-CM

## 2022-11-22 DIAGNOSIS — I10 ESSENTIAL HYPERTENSION: ICD-10-CM

## 2022-11-22 LAB
BILIRUB UR QL STRIP: NEGATIVE
GLUCOSE UR-MCNC: NORMAL MG/DL
HBA1C MFR BLD HPLC: 9.7 %
KETONES P FAST UR STRIP-MCNC: NEGATIVE MG/DL
PH UR STRIP: 5.5 [PH] (ref 4.6–8)
PROT UR QL STRIP: NORMAL
SP GR UR STRIP: 1.02 (ref 1–1.03)
UA UROBILINOGEN AMB POC: NORMAL (ref 0.2–1)
URINALYSIS CLARITY POC: CLEAR
URINALYSIS COLOR POC: YELLOW
URINE BLOOD POC: NORMAL
URINE LEUKOCYTES POC: NORMAL
URINE NITRITES POC: POSITIVE

## 2022-11-22 PROCEDURE — 3046F HEMOGLOBIN A1C LEVEL >9.0%: CPT | Performed by: STUDENT IN AN ORGANIZED HEALTH CARE EDUCATION/TRAINING PROGRAM

## 2022-11-22 PROCEDURE — 3078F DIAST BP <80 MM HG: CPT | Performed by: STUDENT IN AN ORGANIZED HEALTH CARE EDUCATION/TRAINING PROGRAM

## 2022-11-22 PROCEDURE — 81003 URINALYSIS AUTO W/O SCOPE: CPT | Performed by: STUDENT IN AN ORGANIZED HEALTH CARE EDUCATION/TRAINING PROGRAM

## 2022-11-22 PROCEDURE — 3074F SYST BP LT 130 MM HG: CPT | Performed by: STUDENT IN AN ORGANIZED HEALTH CARE EDUCATION/TRAINING PROGRAM

## 2022-11-22 PROCEDURE — 83036 HEMOGLOBIN GLYCOSYLATED A1C: CPT | Performed by: STUDENT IN AN ORGANIZED HEALTH CARE EDUCATION/TRAINING PROGRAM

## 2022-11-22 PROCEDURE — 99214 OFFICE O/P EST MOD 30 MIN: CPT | Performed by: STUDENT IN AN ORGANIZED HEALTH CARE EDUCATION/TRAINING PROGRAM

## 2022-11-22 RX ORDER — LEVOTHYROXINE SODIUM 112 UG/1
112 TABLET ORAL
Qty: 90 TABLET | Refills: 3 | Status: CANCELLED | OUTPATIENT
Start: 2022-11-22

## 2022-11-22 RX ORDER — METFORMIN HYDROCHLORIDE 1000 MG/1
1000 TABLET ORAL 2 TIMES DAILY WITH MEALS
Qty: 180 TABLET | Refills: 1 | Status: SHIPPED | OUTPATIENT
Start: 2022-11-22

## 2022-11-22 RX ORDER — PEN NEEDLE, DIABETIC 31 GX3/16"
NEEDLE, DISPOSABLE MISCELLANEOUS
Qty: 100 PEN NEEDLE | Refills: 10 | Status: SHIPPED | OUTPATIENT
Start: 2022-11-22

## 2022-11-22 RX ORDER — LISINOPRIL 5 MG/1
TABLET ORAL
Qty: 90 TABLET | Refills: 3 | Status: CANCELLED | OUTPATIENT
Start: 2022-11-22

## 2022-11-22 RX ORDER — SULFAMETHOXAZOLE AND TRIMETHOPRIM 800; 160 MG/1; MG/1
1 TABLET ORAL 2 TIMES DAILY
Qty: 20 TABLET | Refills: 0 | Status: SHIPPED | OUTPATIENT
Start: 2022-11-22 | End: 2022-12-02

## 2022-11-22 RX ORDER — INSULIN LISPRO 100 [IU]/ML
INJECTION, SOLUTION INTRAVENOUS; SUBCUTANEOUS
Qty: 6 ML | Refills: 5
Start: 2022-11-22

## 2022-11-22 RX ORDER — LISINOPRIL 5 MG/1
TABLET ORAL
Qty: 90 TABLET | Refills: 3 | Status: SHIPPED | OUTPATIENT
Start: 2022-11-22

## 2022-11-22 NOTE — PROGRESS NOTES
Chief Complaint   Patient presents with    Urinary Pain     Pt states symptoms have been going on for a few days,  he says he became dehydrated and didn't go very often and then started having the pain. 1. \"Have you been to the ER, urgent care clinic since your last visit? Hospitalized since your last visit? \" Yes last month [facility] for pulled chest muscle    2. \"Have you seen or consulted any other health care providers outside of the [address] [place] [address] since your last visit? \" No     3. For patients aged 39-70: Has the patient had a colonoscopy / FIT/ Cologuard? No has a cologuard kit and has not done it yet      If the patient is female:    4. For patients aged 41-77: Has the patient had a mammogram within the past 2 years? NA - based on age or sex      11. For patients aged 21-65: Has the patient had a pap smear?  NA - based on age or sex    Visit Vitals  /74 (BP 1 Location: Left upper arm, BP Patient Position: Sitting)   Pulse 70   Temp 97.5 °F (36.4 °C) (Oral)   Resp 18   Ht 6' (1.829 m)   Wt 173 lb 12.8 oz (78.8 kg)   SpO2 99%   BMI 23.57 kg/m²

## 2022-11-22 NOTE — PROGRESS NOTES
[address] [address] [address]. [place], [address] [place] [address]  [phone]    C/C: Dysuria, Diabetes and Hypothyroidism    HPI:    Gisela Martinez is a 63 y.o. male who presents to clinic today for evaluation of the issues listed above. Current concerns;    1) Dysuria:  Pt complains of urinary frequency, urgency and burning with urination that started about 3 days ago. Associated with cloudy urine and strong odor. No blood in the urine. Pt denies any flank pain, fever, chills, nausea, vomiting. Not currently sexually active    2). DM type II with polyneuropathy:  Has no showed on last appointment and did not go to Endocrinology as recommended at last visit. Diagnosed around 2008. Sporadically checks his BG at home and tends to be very high in the 300s. Admitted at [facility] (09/2022)  with DKA and Metformin held due to Lactic acidosis. A1C 10.1% upon discharge. Discharged meds; Lantus 40 units daily, Jardiance 10mg, Gabapentin 600mg TID. Also given Lispro for correctional scale. Metformin was held. Admits that his diet is poor    2. Acquired hypothyroidism  Last TSH 33.2. Now taking synthroid 112 mcg although I recommended adjusting dose at previous visit to double on weekends (Saturday and Sunday) following results but pt has changed it. Pt denies any  fever, chill, night sweats, chest pain or SOB. Other Health Habits and social history:  Smoking history: no  Alcohol history: no  Occupation: Works at [employer] (on [employer])  Marital status: Patient is currently  and has 2 children (25 yo son and daughter 22 yo). States that he lives in an apartment with 6 other roommates.      Allergies- reviewed:   No Known Allergies    Past Medical History- reviewed:  Past Medical History:   Diagnosis Date    Diabetes ([facility] [address]     Diverticulosis     Neuropathy     Thyroid disease        Family History - reviewed:  Family History   Problem Relation Age of Onset Heart Disease Mother     Cancer Mother     Cancer Father     Cancer Sister        Social History - reviewed:  Social History     Socioeconomic History    Marital status: SINGLE     Spouse name: Not on file    Number of children: Not on file    Years of education: Not on file    Highest education level: Not on file   Occupational History    Not on file   Tobacco Use    Smoking status: Former     Years: 20.00     Types: Cigarettes    Smokeless tobacco: Never    Tobacco comments:     Quit 25 years ago   Vaping Use    Vaping Use: Never used   Substance and Sexual Activity    Alcohol use: Not Currently    Drug use: Not Currently    Sexual activity: Not Currently   Other Topics Concern    Not on file   Social History Narrative    Not on file     Social Determinants of Health     Financial Resource Strain: Not on file   Food Insecurity: Not on file   Transportation Needs: Not on file   Physical Activity: Not on file   Stress: Not on file   Social Connections: Not on file   Intimate Partner Violence: Not on file   Housing Stability: Not on file       Review of systems:     A comprehensive review of systems was negative except for that written in the History of Present Illness. Visit Vitals  /74 (BP 1 Location: Left upper arm, BP Patient Position: Sitting)   Pulse 70   Temp 97.5 °F (36.4 °C) (Oral)   Resp 18   Ht 6' (1.829 m)   Wt 173 lb 12.8 oz (78.8 kg)   SpO2 99%   BMI 23.57 kg/m²       General: Alert and oriented, in no acute distress. Well nourished. EYE: PERRL. Sclera and conjuctival clear. Extraocular movements intact. EARS: External normal, canals clear, tympanic membranes normal.   NOSE: Mucosa healthy without drainage or ulceration. OROPHARYNX: No suspicious lesions, normal dentition, pharynx, tongue and tonsils normal.  NECK: Supple; no masses; thyroid normal.  LUNGS: Respirations unlabored; clear to auscultation bilaterally.   CARDIOVASCULAR: Regular, rate, and rhythm without murmurs, gallops or rubs.  ABDOMEN: Soft; nontender; nondistended; normoactive bowel sounds; no masses or organomegaly. MUSCULOSKELETAL: FROM in all extremities     EXT: No edema. Neurovascularlly intact. Normal gait. SKIN: No rash. No suspicious lesions or moles. Neuro: Mental Status: Pt is alert and oriented to person, place, and time. : Pt declined Digital rectal exam      3 most recent PHQ Screens 11/22/2022   Little interest or pleasure in doing things Not at all   Feeling down, depressed, irritable, or hopeless Not at all   Total Score PHQ 2 0   Trouble falling or staying asleep, or sleeping too much -   Feeling tired or having little energy -   Poor appetite, weight loss, or overeating -   Feeling bad about yourself - or that you are a failure or have let yourself or your family down -   Trouble concentrating on things such as school, work, reading, or watching TV -   Moving or speaking so slowly that other people could have noticed; or the opposite being so fidgety that others notice -   Thoughts of being better off dead, or hurting yourself in some way -   PHQ 9 Score -   How difficult have these problems made it for you to do your work, take care of your home and get along with others -         Assessment/Plan       ICD-10-CM ICD-9-CM    1. Acute cystitis with hematuria  N30.01 595.0 AMB POC URINALYSIS DIP STICK AUTO W/O MICRO      CULTURE, URINE      trimethoprim-sulfamethoxazole (BACTRIM DS, SEPTRA DS) 160-800 mg per tablet      2. Acquired hypothyroidism  E03.9 244.9 TSH 3RD GENERATION      T4, FREE      3. Essential hypertension  I10 401.9 MICROALBUMIN, UR, RAND W/ MICROALB/CREAT RATIO      lisinopriL (PRINIVIL, ZESTRIL) 5 mg tablet      4.  Type 2 diabetes mellitus with diabetic polyneuropathy, with long-term current use of insulin ([facility])  A81.19 279.39 METABOLIC PANEL, COMPREHENSIVE    Z79.4 357.2 AMB POC HEMOGLOBIN A1C     V58.67 MICROALBUMIN, UR, RAND W/ MICROALB/CREAT RATIO      metFORMIN (GLUCOPHAGE) 1,000 mg tablet      insulin lispro (HUMALOG) 100 unit/mL injection      CANCELED: MICROALBUMIN, UR, RAND W/ MICROALB/CREAT RATIO      5. Mixed hyperlipidemia  E78.2 272.2 LIPID PANEL        1. Acute cystitis with hematuria  No evidence of pyelonephritis . No sexually active  - AMB POC URINALYSIS DIP STICK AUTO W/O MICRO - 2+ blood, pos nitrite and trace LE  - CULTURE, URINE; Future  - Start trimethoprim-sulfamethoxazole (BACTRIM DS, SEPTRA DS) 160-800 mg per tablet; Take 1 Tablet by mouth two (2) times a day for 10 days.    -discussed risks/benefits/precautions/side effects of medication with the patient      2. Acquired hypothyroidism  Will continue synthroid 112 mcg daily for now until TSH results available  - TSH 3RD GENERATION; Future  - T4, FREE; Future    3. Essential hypertension    - MICROALBUMIN, UR, RAND W/ MICROALB/CREAT RATIO; Future  - lisinopriL (PRINIVIL, ZESTRIL) 5 mg tablet; TAKE 1/2 TABLET DAILY      4. Type 2 diabetes mellitus with diabetic polyneuropathy, with long-term current use of insulin ([facility])    Lab Results   Component Value Date/Time    Hemoglobin A1c 11.3 (H) 08/10/2021 11:18 AM    Hemoglobin A1c (POC) 9.7 11/22/2022 09:08 AM     - Encouraged to work on lifestyle modifications including diet and exercise. - Continue home monitoring. Glucose goals; Fasting (), preprandial (<140), 2-hr post-prandial (<752)    - METABOLIC PANEL, COMPREHENSIVE; Future  - MICROALBUMIN, UR, RAND W/ MICROALB/CREAT RATIO; Future  - resume metFORMIN (GLUCOPHAGE) 1,000 mg tablet; Take 1 Tablet by mouth two (2) times daily (with meals). - insulin lispro (HUMALOG) 100 unit/mL injection; Sliding scale; For -200 Add 2 units, 201-250 Add 4 units, 251-300 Add 6 units, 301-350 Add 8 units -400 Add 10 units. - continue insulin glargine (LANTUS,BASAGLAR) 100 unit/mL (3 mL) inpn; 40 Units by SubCUTAneous route nightly. - insulin lispro (HUMALOG) 100 unit/mL injection;  Follow sliding scale information of discharge paperwork   -Continue Metformin 1g BID and Jardiance 10mg daily. 5. Mixed hyperlipidemia  Continue Lipitor 40mg daily  - LIPID PANEL; Future    Follow up: 3 months or as needed    I have discussed the diagnosis with the patient and the intended plan as seen in the above orders. The patient has received an after-visit summary and questions were answered concerning future plans. I have discussed medication side effects and warnings with the patient as well. Informed patient to return to the office if new symptoms arise.     Signed By: Sabi Schmidt MD     November 22, 2022

## 2022-11-23 LAB
ALBUMIN SERPL-MCNC: 3.9 G/DL (ref 3.5–5)
ALBUMIN/GLOB SERPL: 1 {RATIO} (ref 1.1–2.2)
ALP SERPL-CCNC: 101 U/L (ref 45–117)
ALT SERPL-CCNC: 35 U/L (ref 12–78)
ANION GAP SERPL CALC-SCNC: 6 MMOL/L (ref 5–15)
AST SERPL-CCNC: 19 U/L (ref 15–37)
BILIRUB SERPL-MCNC: 0.8 MG/DL (ref 0.2–1)
BUN SERPL-MCNC: 22 MG/DL (ref 6–20)
BUN/CREAT SERPL: 21 (ref 12–20)
CALCIUM SERPL-MCNC: 9.6 MG/DL (ref 8.5–10.1)
CHLORIDE SERPL-SCNC: 103 MMOL/L (ref 97–108)
CHOLEST SERPL-MCNC: 143 MG/DL
CO2 SERPL-SCNC: 28 MMOL/L (ref 21–32)
CREAT SERPL-MCNC: 1.05 MG/DL (ref 0.7–1.3)
CREAT UR-MCNC: 117 MG/DL
GLOBULIN SER CALC-MCNC: 3.8 G/DL (ref 2–4)
GLUCOSE SERPL-MCNC: 64 MG/DL (ref 65–100)
HDLC SERPL-MCNC: 69 MG/DL
HDLC SERPL: 2.1 {RATIO} (ref 0–5)
LDLC SERPL CALC-MCNC: 61.8 MG/DL (ref 0–100)
MICROALBUMIN UR-MCNC: 12.4 MG/DL
MICROALBUMIN/CREAT UR-RTO: 106 MG/G (ref 0–30)
POTASSIUM SERPL-SCNC: 4.8 MMOL/L (ref 3.5–5.1)
PROT SERPL-MCNC: 7.7 G/DL (ref 6.4–8.2)
SODIUM SERPL-SCNC: 137 MMOL/L (ref 136–145)
T4 FREE SERPL-MCNC: 0.7 NG/DL (ref 0.8–1.5)
TRIGL SERPL-MCNC: 61 MG/DL (ref ?–150)
TSH SERPL DL<=0.05 MIU/L-ACNC: 18.2 UIU/ML (ref 0.36–3.74)
VLDLC SERPL CALC-MCNC: 12.2 MG/DL

## 2022-11-25 LAB
BACTERIA SPEC CULT: ABNORMAL
CC UR VC: ABNORMAL
SERVICE CMNT-IMP: ABNORMAL

## 2022-12-08 DIAGNOSIS — E11.42 TYPE 2 DIABETES MELLITUS WITH DIABETIC POLYNEUROPATHY, WITH LONG-TERM CURRENT USE OF INSULIN (HCC): ICD-10-CM

## 2022-12-08 DIAGNOSIS — Z79.4 TYPE 2 DIABETES MELLITUS WITH DIABETIC POLYNEUROPATHY, WITH LONG-TERM CURRENT USE OF INSULIN (HCC): ICD-10-CM

## 2022-12-08 RX ORDER — GABAPENTIN 600 MG/1
TABLET ORAL
Qty: 270 TABLET | Refills: 0 | Status: SHIPPED | OUTPATIENT
Start: 2022-12-08

## 2022-12-12 NOTE — PROGRESS NOTES
Reviewed  High TSH, low Free T4. No changes given noncompliance was partly reason for levels.  Recommend taking his medications and we can recheck

## 2023-01-04 LAB — HBA1C MFR BLD HPLC: 9.5 %

## 2023-01-26 DIAGNOSIS — E03.9 ACQUIRED HYPOTHYROIDISM: ICD-10-CM

## 2023-01-27 RX ORDER — LEVOTHYROXINE SODIUM 112 UG/1
TABLET ORAL
Qty: 30 TABLET | Refills: 0 | Status: SHIPPED | OUTPATIENT
Start: 2023-01-27

## 2023-03-04 DIAGNOSIS — E11.42 TYPE 2 DIABETES MELLITUS WITH DIABETIC POLYNEUROPATHY, WITH LONG-TERM CURRENT USE OF INSULIN (HCC): ICD-10-CM

## 2023-03-04 DIAGNOSIS — Z79.4 TYPE 2 DIABETES MELLITUS WITH DIABETIC POLYNEUROPATHY, WITH LONG-TERM CURRENT USE OF INSULIN (HCC): ICD-10-CM

## 2023-03-05 RX ORDER — EMPAGLIFLOZIN 10 MG/1
TABLET, FILM COATED ORAL
Qty: 90 TABLET | Refills: 0 | Status: SHIPPED | OUTPATIENT
Start: 2023-03-05

## 2023-04-26 DIAGNOSIS — E03.9 ACQUIRED HYPOTHYROIDISM: ICD-10-CM

## 2023-04-26 DIAGNOSIS — K21.9 GASTROESOPHAGEAL REFLUX DISEASE, UNSPECIFIED WHETHER ESOPHAGITIS PRESENT: ICD-10-CM

## 2023-04-26 RX ORDER — LEVOTHYROXINE SODIUM 112 UG/1
TABLET ORAL
Qty: 30 TABLET | Refills: 0 | Status: SHIPPED | OUTPATIENT
Start: 2023-04-26

## 2023-04-26 RX ORDER — PANTOPRAZOLE SODIUM 40 MG/1
TABLET, DELAYED RELEASE ORAL
Qty: 30 TABLET | Refills: 0 | Status: SHIPPED | OUTPATIENT
Start: 2023-04-26

## 2023-06-14 DIAGNOSIS — Z79.4 TYPE 2 DIABETES MELLITUS WITH DIABETIC POLYNEUROPATHY, WITH LONG-TERM CURRENT USE OF INSULIN (HCC): Primary | ICD-10-CM

## 2023-06-14 DIAGNOSIS — E11.42 TYPE 2 DIABETES MELLITUS WITH DIABETIC POLYNEUROPATHY, WITH LONG-TERM CURRENT USE OF INSULIN (HCC): Primary | ICD-10-CM

## 2023-06-14 RX ORDER — GABAPENTIN 600 MG/1
600 TABLET ORAL 3 TIMES DAILY
Qty: 270 TABLET | Refills: 0 | OUTPATIENT
Start: 2023-06-14 | End: 2023-09-12

## 2023-06-14 RX ORDER — PANTOPRAZOLE SODIUM 40 MG/1
40 TABLET, DELAYED RELEASE ORAL DAILY
Qty: 90 TABLET | Refills: 0 | OUTPATIENT
Start: 2023-06-14

## 2023-06-14 NOTE — TELEPHONE ENCOUNTER
Last appointment: 11/22/22  Next appointment: Merlyn Bains to follow-up 2/22/23  Previous refill encounter(s): 12/8/22 Neurontin #270, 4/26/23 Protonix #30    Requested Prescriptions     Pending Prescriptions Disp Refills    gabapentin (NEURONTIN) 600 MG tablet [Pharmacy Med Name: Gabapentin 600 MG Oral Tablet] 270 tablet 0     Sig: Take 1 tablet by mouth 3 times daily for 90 days. Patient needs an appointment for further refills Max Daily Amount: 1,800 mg    pantoprazole (PROTONIX) 40 MG tablet [Pharmacy Med Name: Pantoprazole Sodium 40 MG Oral Tablet Delayed Release] 90 tablet 0     Sig: Take 1 tablet by mouth daily Patient needs an appointment for further refills         For Pharmacy Admin Tracking Only    Program: Medication Refill  CPA in place:    Recommendation Provided To:    Intervention Detail: New Rx: 2, reason: Patient Preference  Intervention Accepted By:   Amy Davis Closed?:    Time Spent (min): 5

## 2025-01-28 ENCOUNTER — HOSPITAL ENCOUNTER (EMERGENCY)
Facility: HOSPITAL | Age: 65
Discharge: HOME | End: 2025-01-28
Payer: COMMERCIAL

## 2025-01-28 VITALS
HEIGHT: 72 IN | RESPIRATION RATE: 16 BRPM | SYSTOLIC BLOOD PRESSURE: 132 MMHG | WEIGHT: 184 LBS | TEMPERATURE: 99.9 F | OXYGEN SATURATION: 97 % | DIASTOLIC BLOOD PRESSURE: 80 MMHG | BODY MASS INDEX: 24.92 KG/M2 | HEART RATE: 87 BPM

## 2025-01-28 DIAGNOSIS — J10.1 INFLUENZA A: Primary | ICD-10-CM

## 2025-01-28 LAB
FLUAV RNA SPEC QL NAA+PROBE: DETECTED
FLUBV RNA SPEC QL NAA+PROBE: NOT DETECTED
SARS-COV-2 RNA RESP QL NAA+PROBE: NOT DETECTED
SOURCE: ABNORMAL

## 2025-01-28 PROCEDURE — 99283 EMERGENCY DEPT VISIT LOW MDM: CPT

## 2025-01-28 PROCEDURE — 87636 SARSCOV2 & INF A&B AMP PRB: CPT

## 2025-01-28 RX ORDER — OSELTAMIVIR PHOSPHATE 75 MG/1
75 CAPSULE ORAL 2 TIMES DAILY
Qty: 10 CAPSULE | Refills: 0 | Status: SHIPPED | OUTPATIENT
Start: 2025-01-28 | End: 2025-02-02

## 2025-01-28 ASSESSMENT — PAIN SCALES - GENERAL: PAINLEVEL_OUTOF10: 6

## 2025-01-28 ASSESSMENT — PAIN - FUNCTIONAL ASSESSMENT: PAIN_FUNCTIONAL_ASSESSMENT: 0-10

## 2025-01-28 ASSESSMENT — PAIN DESCRIPTION - LOCATION: LOCATION: GENERALIZED

## 2025-01-29 NOTE — DISCHARGE INSTRUCTIONS
Thank you for choosing our Emergency Department for your care.  It is our privilege to care for you in your time of need.  In the next several days, you may receive a survey via email or mailed to your home about your experience with our team.  We would greatly appreciate you taking a few minutes to complete the survey, as we use this information to learn what we have done well and what we could be doing better. Thank you for trusting us with your care!    Below you will find a list of your tests from today's visit.   Labs and Radiology Studies  Recent Results (from the past 12 hour(s))   COVID-19 & Influenza Combo    Collection Time: 01/28/25  7:42 PM    Specimen: Nasopharyngeal   Result Value Ref Range    Source Nasopharyngeal      SARS-CoV-2, PCR Not detected NOTD      Rapid Influenza A By PCR Detected (A) NOTD      Rapid Influenza B By PCR Not detected NOTD       No results found.  ------------------------------------------------------------------------------------------------------------  The evaluation and treatment you received in the Emergency Department were for an urgent problem. It is important that you follow-up with a doctor, nurse practitioner, or physician assistant to:  (1) confirm your diagnosis,  (2) re-evaluation of changes in your illness and treatment, and (3) for ongoing care. Please take your discharge instructions with you when you go to your follow-up appointment.     If you have any problem arranging a follow-up appointment, contact us!  If your symptoms become worse or you do not improve as expected, please return to us. We are available 24 hours a day.     If a prescription has been provided, please fill it as soon as possible to prevent a delay in treatment. If you have any questions or reservations about taking the medication due to side effects or interactions with other medications, please call your primary care provider or contact us directly.  Again, THANK YOU for choosing us to

## 2025-01-29 NOTE — ED TRIAGE NOTES
Pt ambulatory to triage with sore throat, cough and body aches that started on Saturday.  Pt took musinex this morning.

## 2025-01-29 NOTE — ED PROVIDER NOTES
present.   Cardiovascular:      Rate and Rhythm: Normal rate and regular rhythm.      Heart sounds: Normal heart sounds.   Pulmonary:      Effort: Pulmonary effort is normal.      Breath sounds: Normal breath sounds.   Skin:     General: Skin is warm and dry.   Neurological:      Mental Status: He is alert and oriented to person, place, and time.   Psychiatric:         Mood and Affect: Mood normal.         Behavior: Behavior normal.         Thought Content: Thought content normal.         Judgment: Judgment normal.         SCREENINGS                  LAB, EKG AND DIAGNOSTIC RESULTS   Labs:  No results found for this or any previous visit (from the past 12 hour(s)).    EKG: Not Applicable    Radiologic Studies:  Non-plain film images such as CT, Ultrasound and MRI are read by the radiologist. Plain radiographic images are visualized and preliminarily interpreted by the ED Physician with the following findings: Not Applicable.    Interpretation per the Radiologist below, if available at the time of this note:  No orders to display        ED COURSE and DIFFERENTIAL DIAGNOSIS/MDM   7:44 PM Differential and Considerations:     Patient presents with complaints of chills, sore throat, productive cough, body aches, nasal congestion . Symptoms consistent with a diagnosis of an uncomplicated viral URI. DDx: Influenza, covid, acute bronchitis, URI, PNA, sinusitis. Given physical exam and duration of sx, less likely PNA and therefore CXR would not be useful/.  Will test for COVID and flu.  Discussed plan of care with patient and they are in agreement.      Records Reviewed (source and summary of external notes): Prior medical records and Nursing notes    Vitals:    Vitals:    01/28/25 1931   BP: 132/80   Pulse: 87   Resp: 16   Temp: 99.9 °F (37.7 °C)   TempSrc: Oral   SpO2: 97%   Weight: 83.5 kg (184 lb)   Height: 1.829 m (6')        ED COURSE  ED Course as of 01/28/25 2043 Tue Jan 28, 2025 2019 Positive